# Patient Record
Sex: MALE | Race: WHITE | Employment: PART TIME | URBAN - METROPOLITAN AREA
[De-identification: names, ages, dates, MRNs, and addresses within clinical notes are randomized per-mention and may not be internally consistent; named-entity substitution may affect disease eponyms.]

---

## 2021-03-23 ENCOUNTER — HOSPITAL ENCOUNTER (OUTPATIENT)
Dept: GENERAL RADIOLOGY | Age: 61
Discharge: HOME | End: 2021-03-25
Payer: COMMERCIAL

## 2021-03-23 ENCOUNTER — HOSPITAL ENCOUNTER (OUTPATIENT)
Dept: CT IMAGING | Age: 61
Discharge: HOME | End: 2021-03-25
Payer: COMMERCIAL

## 2021-03-23 ENCOUNTER — HOSPITAL ENCOUNTER (OUTPATIENT)
Age: 61
Discharge: HOME | End: 2021-03-25
Payer: COMMERCIAL

## 2021-03-23 DIAGNOSIS — R52 PAIN: ICD-10-CM

## 2021-03-23 DIAGNOSIS — R27.0 ATAXIA: ICD-10-CM

## 2021-03-23 DIAGNOSIS — S32.009A CLOSED FRACTURE OF LUMBAR VERTEBRA WITHOUT SPINAL CORD INJURY, INITIAL ENCOUNTER (HCC): ICD-10-CM

## 2021-03-23 DIAGNOSIS — S32.89XA FRACTURE OF OTH PARTS OF PELVIS, INIT FOR CLOS FX (HCC): ICD-10-CM

## 2021-03-23 DIAGNOSIS — S00.93XA CONTUSION OF HEAD, UNSPECIFIED PART OF HEAD, INITIAL ENCOUNTER: ICD-10-CM

## 2021-03-23 PROCEDURE — 72170 X-RAY EXAM OF PELVIS: CPT

## 2021-03-23 PROCEDURE — 72110 X-RAY EXAM L-2 SPINE 4/>VWS: CPT

## 2021-03-23 PROCEDURE — 6360000004 HC RX CONTRAST MEDICATION: Performed by: RADIOLOGY

## 2021-03-23 PROCEDURE — 70470 CT HEAD/BRAIN W/O & W/DYE: CPT

## 2021-03-23 RX ADMIN — IOPAMIDOL 90 ML: 755 INJECTION, SOLUTION INTRAVENOUS at 15:31

## 2022-04-08 ENCOUNTER — APPOINTMENT (OUTPATIENT)
Dept: GENERAL RADIOLOGY | Age: 62
DRG: 287 | End: 2022-04-08
Payer: COMMERCIAL

## 2022-04-08 ENCOUNTER — HOSPITAL ENCOUNTER (INPATIENT)
Age: 62
LOS: 1 days | Discharge: HOME | DRG: 287 | End: 2022-04-13
Attending: EMERGENCY MEDICINE | Admitting: INTERNAL MEDICINE
Payer: COMMERCIAL

## 2022-04-08 DIAGNOSIS — R07.9 CHEST PAIN, UNSPECIFIED TYPE: Primary | ICD-10-CM

## 2022-04-08 PROBLEM — F10.10 ALCOHOL ABUSE: Status: ACTIVE | Noted: 2022-04-08

## 2022-04-08 LAB
ACETAMINOPHEN LEVEL: <5 MCG/ML (ref 10–30)
ALBUMIN SERPL-MCNC: 4.7 G/DL (ref 3.5–5.2)
ALP BLD-CCNC: 55 U/L (ref 40–129)
ALT SERPL-CCNC: 59 U/L (ref 0–40)
ANION GAP SERPL CALCULATED.3IONS-SCNC: 15 MMOL/L (ref 7–16)
APTT: 31.6 SEC (ref 24.5–35.1)
AST SERPL-CCNC: 137 U/L (ref 0–39)
BASOPHILS ABSOLUTE: 0.04 E9/L (ref 0–0.2)
BASOPHILS RELATIVE PERCENT: 0.9 % (ref 0–2)
BILIRUB SERPL-MCNC: 0.6 MG/DL (ref 0–1.2)
BUN BLDV-MCNC: 9 MG/DL (ref 6–23)
CALCIUM SERPL-MCNC: 9.4 MG/DL (ref 8.6–10.2)
CHLORIDE BLD-SCNC: 98 MMOL/L (ref 98–107)
CO2: 24 MMOL/L (ref 22–29)
CREAT SERPL-MCNC: 0.8 MG/DL (ref 0.7–1.2)
EOSINOPHILS ABSOLUTE: 0.02 E9/L (ref 0.05–0.5)
EOSINOPHILS RELATIVE PERCENT: 0.5 % (ref 0–6)
ETHANOL: 69 MG/DL (ref 0–0.08)
GFR AFRICAN AMERICAN: >60
GFR NON-AFRICAN AMERICAN: >60 ML/MIN/1.73
GLUCOSE BLD-MCNC: 113 MG/DL (ref 74–99)
HCT VFR BLD CALC: 42.1 % (ref 37–54)
HEMOGLOBIN: 14.7 G/DL (ref 12.5–16.5)
IMMATURE GRANULOCYTES #: 0.01 E9/L
IMMATURE GRANULOCYTES %: 0.2 % (ref 0–5)
INR BLD: 0.9
LYMPHOCYTES ABSOLUTE: 0.9 E9/L (ref 1.5–4)
LYMPHOCYTES RELATIVE PERCENT: 21.2 % (ref 20–42)
MCH RBC QN AUTO: 33.5 PG (ref 26–35)
MCHC RBC AUTO-ENTMCNC: 34.9 % (ref 32–34.5)
MCV RBC AUTO: 95.9 FL (ref 80–99.9)
MONOCYTES ABSOLUTE: 0.62 E9/L (ref 0.1–0.95)
MONOCYTES RELATIVE PERCENT: 14.6 % (ref 2–12)
NEUTROPHILS ABSOLUTE: 2.65 E9/L (ref 1.8–7.3)
NEUTROPHILS RELATIVE PERCENT: 62.6 % (ref 43–80)
PDW BLD-RTO: 12.3 FL (ref 11.5–15)
PLATELET # BLD: 96 E9/L (ref 130–450)
PLATELET CONFIRMATION: NORMAL
PMV BLD AUTO: 10.3 FL (ref 7–12)
POTASSIUM SERPL-SCNC: 3.7 MMOL/L (ref 3.5–5)
PRO-BNP: 80 PG/ML (ref 0–125)
PROTHROMBIN TIME: 10.3 SEC (ref 9.3–12.4)
RBC # BLD: 4.39 E12/L (ref 3.8–5.8)
SALICYLATE, SERUM: 3.9 MG/DL (ref 0–30)
SODIUM BLD-SCNC: 137 MMOL/L (ref 132–146)
TOTAL PROTEIN: 7.4 G/DL (ref 6.4–8.3)
TRICYCLIC ANTIDEPRESSANTS SCREEN SERUM: NEGATIVE NG/ML
TROPONIN, HIGH SENSITIVITY: 10 NG/L (ref 0–11)
TROPONIN, HIGH SENSITIVITY: 9 NG/L (ref 0–11)
WBC # BLD: 4.2 E9/L (ref 4.5–11.5)

## 2022-04-08 PROCEDURE — 80053 COMPREHEN METABOLIC PANEL: CPT

## 2022-04-08 PROCEDURE — G0378 HOSPITAL OBSERVATION PER HR: HCPCS

## 2022-04-08 PROCEDURE — 85025 COMPLETE CBC W/AUTO DIFF WBC: CPT

## 2022-04-08 PROCEDURE — 6370000000 HC RX 637 (ALT 250 FOR IP): Performed by: EMERGENCY MEDICINE

## 2022-04-08 PROCEDURE — 84484 ASSAY OF TROPONIN QUANT: CPT

## 2022-04-08 PROCEDURE — 96374 THER/PROPH/DIAG INJ IV PUSH: CPT

## 2022-04-08 PROCEDURE — 96372 THER/PROPH/DIAG INJ SC/IM: CPT

## 2022-04-08 PROCEDURE — 82077 ASSAY SPEC XCP UR&BREATH IA: CPT

## 2022-04-08 PROCEDURE — 2580000003 HC RX 258: Performed by: INTERNAL MEDICINE

## 2022-04-08 PROCEDURE — 80143 DRUG ASSAY ACETAMINOPHEN: CPT

## 2022-04-08 PROCEDURE — 80307 DRUG TEST PRSMV CHEM ANLYZR: CPT

## 2022-04-08 PROCEDURE — 80179 DRUG ASSAY SALICYLATE: CPT

## 2022-04-08 PROCEDURE — 85730 THROMBOPLASTIN TIME PARTIAL: CPT

## 2022-04-08 PROCEDURE — 85610 PROTHROMBIN TIME: CPT

## 2022-04-08 PROCEDURE — 71045 X-RAY EXAM CHEST 1 VIEW: CPT

## 2022-04-08 PROCEDURE — 93005 ELECTROCARDIOGRAM TRACING: CPT | Performed by: EMERGENCY MEDICINE

## 2022-04-08 PROCEDURE — 2580000003 HC RX 258: Performed by: EMERGENCY MEDICINE

## 2022-04-08 PROCEDURE — 6360000002 HC RX W HCPCS: Performed by: INTERNAL MEDICINE

## 2022-04-08 PROCEDURE — 2060000000 HC ICU INTERMEDIATE R&B

## 2022-04-08 PROCEDURE — 83880 ASSAY OF NATRIURETIC PEPTIDE: CPT

## 2022-04-08 PROCEDURE — 99285 EMERGENCY DEPT VISIT HI MDM: CPT

## 2022-04-08 RX ORDER — ONDANSETRON 2 MG/ML
4 INJECTION INTRAMUSCULAR; INTRAVENOUS EVERY 6 HOURS PRN
Status: DISCONTINUED | OUTPATIENT
Start: 2022-04-08 | End: 2022-04-12

## 2022-04-08 RX ORDER — LORAZEPAM 1 MG/1
3 TABLET ORAL
Status: DISCONTINUED | OUTPATIENT
Start: 2022-04-08 | End: 2022-04-12

## 2022-04-08 RX ORDER — MULTIVIT WITH MINERALS/LUTEIN
1000 TABLET ORAL 2 TIMES DAILY
COMMUNITY

## 2022-04-08 RX ORDER — LORAZEPAM 1 MG/1
1 TABLET ORAL
Status: DISCONTINUED | OUTPATIENT
Start: 2022-04-08 | End: 2022-04-12

## 2022-04-08 RX ORDER — ONDANSETRON 4 MG/1
4 TABLET, ORALLY DISINTEGRATING ORAL EVERY 8 HOURS PRN
Status: DISCONTINUED | OUTPATIENT
Start: 2022-04-08 | End: 2022-04-13 | Stop reason: HOSPADM

## 2022-04-08 RX ORDER — LORAZEPAM 1 MG/1
2 TABLET ORAL ONCE
Status: COMPLETED | OUTPATIENT
Start: 2022-04-08 | End: 2022-04-08

## 2022-04-08 RX ORDER — LANOLIN ALCOHOL/MO/W.PET/CERES
1000 CREAM (GRAM) TOPICAL DAILY
Status: DISCONTINUED | OUTPATIENT
Start: 2022-04-09 | End: 2022-04-13 | Stop reason: HOSPADM

## 2022-04-08 RX ORDER — LORAZEPAM 2 MG/ML
3 INJECTION INTRAMUSCULAR
Status: DISCONTINUED | OUTPATIENT
Start: 2022-04-08 | End: 2022-04-12

## 2022-04-08 RX ORDER — ASPIRIN 81 MG/1
81 TABLET ORAL DAILY
COMMUNITY

## 2022-04-08 RX ORDER — ACETAMINOPHEN 325 MG/1
650 TABLET ORAL EVERY 6 HOURS PRN
Status: DISCONTINUED | OUTPATIENT
Start: 2022-04-08 | End: 2022-04-13 | Stop reason: HOSPADM

## 2022-04-08 RX ORDER — ASPIRIN 81 MG/1
81 TABLET ORAL DAILY
Status: DISCONTINUED | OUTPATIENT
Start: 2022-04-09 | End: 2022-04-13 | Stop reason: HOSPADM

## 2022-04-08 RX ORDER — ACETAMINOPHEN 650 MG/1
650 SUPPOSITORY RECTAL EVERY 6 HOURS PRN
Status: DISCONTINUED | OUTPATIENT
Start: 2022-04-08 | End: 2022-04-12

## 2022-04-08 RX ORDER — LORAZEPAM 2 MG/ML
1 INJECTION INTRAMUSCULAR
Status: DISCONTINUED | OUTPATIENT
Start: 2022-04-08 | End: 2022-04-12

## 2022-04-08 RX ORDER — THIAMINE HYDROCHLORIDE 100 MG/ML
100 INJECTION, SOLUTION INTRAMUSCULAR; INTRAVENOUS DAILY
Status: DISCONTINUED | OUTPATIENT
Start: 2022-04-08 | End: 2022-04-13 | Stop reason: HOSPADM

## 2022-04-08 RX ORDER — SODIUM CHLORIDE 9 MG/ML
INJECTION, SOLUTION INTRAVENOUS PRN
Status: DISCONTINUED | OUTPATIENT
Start: 2022-04-08 | End: 2022-04-13 | Stop reason: HOSPADM

## 2022-04-08 RX ORDER — SODIUM CHLORIDE 0.9 % (FLUSH) 0.9 %
5-40 SYRINGE (ML) INJECTION EVERY 12 HOURS SCHEDULED
Status: DISCONTINUED | OUTPATIENT
Start: 2022-04-08 | End: 2022-04-13 | Stop reason: HOSPADM

## 2022-04-08 RX ORDER — SODIUM CHLORIDE 0.9 % (FLUSH) 0.9 %
5-40 SYRINGE (ML) INJECTION PRN
Status: DISCONTINUED | OUTPATIENT
Start: 2022-04-08 | End: 2022-04-13 | Stop reason: HOSPADM

## 2022-04-08 RX ORDER — ASPIRIN 81 MG/1
324 TABLET, CHEWABLE ORAL ONCE
Status: COMPLETED | OUTPATIENT
Start: 2022-04-08 | End: 2022-04-08

## 2022-04-08 RX ORDER — LORAZEPAM 1 MG/1
4 TABLET ORAL
Status: DISCONTINUED | OUTPATIENT
Start: 2022-04-08 | End: 2022-04-12

## 2022-04-08 RX ORDER — ZINC SULFATE 50(220)MG
50 CAPSULE ORAL DAILY
COMMUNITY

## 2022-04-08 RX ORDER — POLYETHYLENE GLYCOL 3350 17 G/17G
17 POWDER, FOR SOLUTION ORAL DAILY PRN
Status: DISCONTINUED | OUTPATIENT
Start: 2022-04-08 | End: 2022-04-13 | Stop reason: HOSPADM

## 2022-04-08 RX ORDER — LORAZEPAM 1 MG/1
2 TABLET ORAL
Status: DISCONTINUED | OUTPATIENT
Start: 2022-04-08 | End: 2022-04-12

## 2022-04-08 RX ORDER — LORAZEPAM 2 MG/ML
4 INJECTION INTRAMUSCULAR
Status: DISCONTINUED | OUTPATIENT
Start: 2022-04-08 | End: 2022-04-12

## 2022-04-08 RX ORDER — LORAZEPAM 2 MG/ML
2 INJECTION INTRAMUSCULAR
Status: DISCONTINUED | OUTPATIENT
Start: 2022-04-08 | End: 2022-04-12

## 2022-04-08 RX ADMIN — SODIUM CHLORIDE, PRESERVATIVE FREE 10 ML: 5 INJECTION INTRAVENOUS at 23:25

## 2022-04-08 RX ADMIN — Medication 10 ML: at 23:58

## 2022-04-08 RX ADMIN — LORAZEPAM 2 MG: 1 TABLET ORAL at 15:14

## 2022-04-08 RX ADMIN — LORAZEPAM 2 MG: 1 TABLET ORAL at 23:25

## 2022-04-08 RX ADMIN — ENOXAPARIN SODIUM 40 MG: 40 INJECTION SUBCUTANEOUS at 23:25

## 2022-04-08 RX ADMIN — LORAZEPAM 1 MG: 1 TABLET ORAL at 16:39

## 2022-04-08 RX ADMIN — ASPIRIN 81 MG 324 MG: 81 TABLET ORAL at 16:28

## 2022-04-08 RX ADMIN — THIAMINE HYDROCHLORIDE 100 MG: 100 INJECTION, SOLUTION INTRAMUSCULAR; INTRAVENOUS at 23:25

## 2022-04-08 NOTE — ED PROVIDER NOTES
and oriented x3, tremor   Head: Normocephalic and atraumatic  Eyes: PERRL, EOMI, sclera non icteric  Mouth: Oropharynx clear, handling secretions, no trismus, no asymmetry of the posterior oropharynx or uvular edema  Neck: Supple, full ROM, no stridor, no meningeal signs  Respiratory: Lungs clear to auscultation bilaterally, no wheezes, rales, or rhonchi. Not in respiratory distress  Cardiovascular:  Regular rate. Regular rhythm. 2+ distal pulses. Equal extremity pulses. Chest: No chest wall tenderness  GI:  Abdomen Soft, Non tender, Non distended. No rebound, guarding, or rigidity. No pulsatile masses. Musculoskeletal: Moves all extremities x 4. Warm and well perfused, no clubbing, cyanosis, or edema. Capillary refill <3 seconds  Integument: skin warm and dry. No rashes. Neurologic: GCS 15, no focal deficits, symmetric strength 5/5 in the upper and lower extremities bilaterally  Psychiatric: Normal Affect          -------------------------------------------------- RESULTS -------------------------------------------------  I have personally reviewed all laboratory and imaging results for this patient. Results are listed below.      LABS: (Lab results interpreted by me)  Results for orders placed or performed during the hospital encounter of 04/08/22   Comprehensive Metabolic Panel   Result Value Ref Range    Sodium 137 132 - 146 mmol/L    Potassium 3.7 3.5 - 5.0 mmol/L    Chloride 98 98 - 107 mmol/L    CO2 24 22 - 29 mmol/L    Anion Gap 15 7 - 16 mmol/L    Glucose 113 (H) 74 - 99 mg/dL    BUN 9 6 - 23 mg/dL    CREATININE 0.8 0.7 - 1.2 mg/dL    GFR Non-African American >60 >=60 mL/min/1.73    GFR African American >60     Calcium 9.4 8.6 - 10.2 mg/dL    Total Protein 7.4 6.4 - 8.3 g/dL    Albumin 4.7 3.5 - 5.2 g/dL    Total Bilirubin 0.6 0.0 - 1.2 mg/dL    Alkaline Phosphatase 55 40 - 129 U/L    ALT 59 (H) 0 - 40 U/L     (H) 0 - 39 U/L   Troponin   Result Value Ref Range    Troponin, High Sensitivity 10 0 - 11 ng/L   CBC with Auto Differential   Result Value Ref Range    WBC 4.2 (L) 4.5 - 11.5 E9/L    RBC 4.39 3.80 - 5.80 E12/L    Hemoglobin 14.7 12.5 - 16.5 g/dL    Hematocrit 42.1 37.0 - 54.0 %    MCV 95.9 80.0 - 99.9 fL    MCH 33.5 26.0 - 35.0 pg    MCHC 34.9 (H) 32.0 - 34.5 %    RDW 12.3 11.5 - 15.0 fL    Platelets 96 (L) 159 - 450 E9/L    MPV 10.3 7.0 - 12.0 fL    Neutrophils % 62.6 43.0 - 80.0 %    Immature Granulocytes % 0.2 0.0 - 5.0 %    Lymphocytes % 21.2 20.0 - 42.0 %    Monocytes % 14.6 (H) 2.0 - 12.0 %    Eosinophils % 0.5 0.0 - 6.0 %    Basophils % 0.9 0.0 - 2.0 %    Neutrophils Absolute 2.65 1.80 - 7.30 E9/L    Immature Granulocytes # 0.01 E9/L    Lymphocytes Absolute 0.90 (L) 1.50 - 4.00 E9/L    Monocytes Absolute 0.62 0.10 - 0.95 E9/L    Eosinophils Absolute 0.02 (L) 0.05 - 0.50 E9/L    Basophils Absolute 0.04 0.00 - 0.20 E9/L   Brain Natriuretic Peptide   Result Value Ref Range    Pro-BNP 80 0 - 125 pg/mL   Protime-INR   Result Value Ref Range    Protime 10.3 9.3 - 12.4 sec    INR 0.9    APTT   Result Value Ref Range    aPTT 31.6 24.5 - 35.1 sec   Serum Drug Screen   Result Value Ref Range    Ethanol Lvl 69 mg/dL    Acetaminophen Level <5.0 (L) 10.0 - 63.7 mcg/mL    Salicylate, Serum 3.9 0.0 - 30.0 mg/dL    TCA Scrn NEGATIVE Cutoff:300 ng/mL   ,       RADIOLOGY:  Interpreted by Radiologist unless otherwise specified  XR CHEST PORTABLE   Final Result   No acute process.                EKG Interpretation  Interpreted by emergency department physician, Dr. Elma Jackson     Sinus, rate 93, T wave inversions inferiorly, no stemi, no when compared to previous study         ------------------------- NURSING NOTES AND VITALS REVIEWED ---------------------------   The nursing notes within the ED encounter and vital signs as below have been reviewed by myself  BP (!) 144/88   Pulse 83   Temp 97.8 °F (36.6 °C)   Resp 21   Ht 5' 11\" (1.803 m)   Wt 157 lb (71.2 kg)   SpO2 97%   BMI 21.90 kg/m²     Oxygen Saturation Interpretation: Normal    The patients available past medical records and past encounters were reviewed. ------------------------------ ED COURSE/MEDICAL DECISION MAKING----------------------  Medications   sodium chloride flush 0.9 % injection 5-40 mL (has no administration in time range)   sodium chloride flush 0.9 % injection 5-40 mL (has no administration in time range)   0.9 % sodium chloride infusion (has no administration in time range)   LORazepam (ATIVAN) tablet 1 mg (has no administration in time range)     Or   LORazepam (ATIVAN) injection 1 mg (has no administration in time range)     Or   LORazepam (ATIVAN) tablet 2 mg (has no administration in time range)     Or   LORazepam (ATIVAN) injection 2 mg (has no administration in time range)     Or   LORazepam (ATIVAN) tablet 3 mg (has no administration in time range)     Or   LORazepam (ATIVAN) injection 3 mg (has no administration in time range)     Or   LORazepam (ATIVAN) tablet 4 mg (has no administration in time range)     Or   LORazepam (ATIVAN) injection 4 mg (has no administration in time range)   aspirin chewable tablet 324 mg (has no administration in time range)   LORazepam (ATIVAN) tablet 2 mg (2 mg Oral Given 4/8/22 1514)           The cardiac monitor revealed sinus with a heart rate in the 80s as interpreted by me. The cardiac monitor was ordered secondary to the patient's CP and to monitor the patient for dysrhythmia. CPT [id number]         Medical Decision Making:    Patient did have some tremors on arrival.  He was placed on the CIWA protocol. Labs and imaging reviewed. Discussed with the hospitalist given his EKG changes, patient will be admitted. Counseling: The emergency provider has spoken with the patient and discussed todays results, in addition to providing specific details for the plan of care and counseling regarding the diagnosis and prognosis.   Questions are answered at this time and they are agreeable with the plan.       --------------------------------- IMPRESSION AND DISPOSITION ---------------------------------    IMPRESSION  1. Chest pain, unspecified type        DISPOSITION  Disposition: Admit to telemetry  Patient condition is stable        NOTE: This report was transcribed using voice recognition software.  Every effort was made to ensure accuracy; however, inadvertent computerized transcription errors may be present       Love Trevizo MD  04/08/22 1331

## 2022-04-08 NOTE — Clinical Note
Discharge Plan[de-identified] Other/[facility])   Telemetry/Cardiac Monitoring Required?: Yes

## 2022-04-08 NOTE — ED NOTES
Pt reports having his last drink (whisky) this morning. The exact time was unknown.       Wallace Hall RN  04/08/22 9041

## 2022-04-09 LAB
AMPHETAMINE SCREEN, URINE: NOT DETECTED
BARBITURATE SCREEN URINE: NOT DETECTED
BENZODIAZEPINE SCREEN, URINE: POSITIVE
CANNABINOID SCREEN URINE: NOT DETECTED
COCAINE METABOLITE SCREEN URINE: NOT DETECTED
FENTANYL SCREEN, URINE: NOT DETECTED
Lab: ABNORMAL
METHADONE SCREEN, URINE: NOT DETECTED
OPIATE SCREEN URINE: NOT DETECTED
OXYCODONE URINE: NOT DETECTED
PHENCYCLIDINE SCREEN URINE: NOT DETECTED

## 2022-04-09 PROCEDURE — 96375 TX/PRO/DX INJ NEW DRUG ADDON: CPT

## 2022-04-09 PROCEDURE — 6370000000 HC RX 637 (ALT 250 FOR IP): Performed by: INTERNAL MEDICINE

## 2022-04-09 PROCEDURE — APPSS60 APP SPLIT SHARED TIME 46-60 MINUTES: Performed by: PHYSICIAN ASSISTANT

## 2022-04-09 PROCEDURE — 99204 OFFICE O/P NEW MOD 45 MIN: CPT | Performed by: INTERNAL MEDICINE

## 2022-04-09 PROCEDURE — 6360000002 HC RX W HCPCS: Performed by: EMERGENCY MEDICINE

## 2022-04-09 PROCEDURE — 6360000002 HC RX W HCPCS: Performed by: INTERNAL MEDICINE

## 2022-04-09 PROCEDURE — 80307 DRUG TEST PRSMV CHEM ANLYZR: CPT

## 2022-04-09 PROCEDURE — 93005 ELECTROCARDIOGRAM TRACING: CPT | Performed by: PHYSICIAN ASSISTANT

## 2022-04-09 PROCEDURE — G0378 HOSPITAL OBSERVATION PER HR: HCPCS

## 2022-04-09 PROCEDURE — 2580000003 HC RX 258: Performed by: EMERGENCY MEDICINE

## 2022-04-09 PROCEDURE — 6370000000 HC RX 637 (ALT 250 FOR IP): Performed by: EMERGENCY MEDICINE

## 2022-04-09 PROCEDURE — 2580000003 HC RX 258: Performed by: INTERNAL MEDICINE

## 2022-04-09 PROCEDURE — 96376 TX/PRO/DX INJ SAME DRUG ADON: CPT

## 2022-04-09 RX ADMIN — SODIUM CHLORIDE, PRESERVATIVE FREE 10 ML: 5 INJECTION INTRAVENOUS at 09:39

## 2022-04-09 RX ADMIN — CYANOCOBALAMIN TAB 1000 MCG 1000 MCG: 1000 TAB at 09:39

## 2022-04-09 RX ADMIN — LORAZEPAM 2 MG: 2 INJECTION INTRAMUSCULAR; INTRAVENOUS at 21:04

## 2022-04-09 RX ADMIN — Medication 10 ML: at 21:58

## 2022-04-09 RX ADMIN — LORAZEPAM 1 MG: 2 INJECTION INTRAMUSCULAR; INTRAVENOUS at 16:33

## 2022-04-09 RX ADMIN — LORAZEPAM 1 MG: 2 INJECTION INTRAMUSCULAR; INTRAVENOUS at 09:39

## 2022-04-09 RX ADMIN — ASPIRIN 81 MG: 81 TABLET, COATED ORAL at 09:39

## 2022-04-09 RX ADMIN — SODIUM CHLORIDE, PRESERVATIVE FREE 10 ML: 5 INJECTION INTRAVENOUS at 21:03

## 2022-04-09 RX ADMIN — SODIUM CHLORIDE, PRESERVATIVE FREE 10 ML: 5 INJECTION INTRAVENOUS at 16:33

## 2022-04-09 RX ADMIN — Medication 10 ML: at 09:39

## 2022-04-09 RX ADMIN — THIAMINE HYDROCHLORIDE 100 MG: 100 INJECTION, SOLUTION INTRAMUSCULAR; INTRAVENOUS at 09:39

## 2022-04-09 RX ADMIN — LORAZEPAM 4 MG: 1 TABLET ORAL at 02:43

## 2022-04-09 ASSESSMENT — PAIN SCALES - GENERAL: PAINLEVEL_OUTOF10: 0

## 2022-04-09 NOTE — H&P
Angela Vega is a 64 y.o. male  Chief Complaint   Patient presents with    Chest Pain     chest pain and SOB for 1 week, PCP told him he was suppose to get an ECHO back in september last year but never got it done. +ETOH every day      HPI  As above, pt states he wants to go home, but he wants to have an Echo first.  He denies any more cp, sob, n/v.  He does admit to drinking on a daily basis, but gets upset once asking about it. Allergies   Allergen Reactions    Sulfa Antibiotics      rash       No current facility-administered medications on file prior to encounter.      Current Outpatient Medications on File Prior to Encounter   Medication Sig Dispense Refill    aspirin 81 MG EC tablet Take 81 mg by mouth daily      zinc sulfate (ZINCATE) 220 (50 Zn) MG capsule Take 50 mg by mouth daily      vitamin D 25 MCG (1000 UT) CAPS Take 1,000 Units by mouth daily      Ascorbic Acid (VITAMIN C) 1000 MG tablet Take 1,000 mg by mouth 2 times daily      ELDERBERRY PO Take 1 tablet by mouth daily      Cyanocobalamin (VITAMIN B12) 1000 MCG TBCR Take 1,000 mcg by mouth daily        Past Medical History:   Diagnosis Date    Chronic venous insufficiency 2/26/2015    History of DVT (deep vein thrombosis) 2/26/2015    Hx of blood clots 2009    LEFT LEG    Inguinal hernia     Varicose veins of lower extremities 2/26/2015   Code status: FULL CODE  Past Surgical History:   Procedure Laterality Date    HERNIA REPAIR Bilateral 12/028/2013    Inguinal     Social History     Socioeconomic History    Marital status: Single     Spouse name: Not on file    Number of children: Not on file    Years of education: Not on file    Highest education level: Not on file   Occupational History    Not on file   Tobacco Use    Smoking status: Never Smoker    Smokeless tobacco: Current User     Types: Chew   Substance and Sexual Activity    Alcohol use: Yes     Comment: 4-5 shots daily    Drug use: No    Sexual activity: Not on file   Other Topics Concern    Not on file   Social History Narrative    Not on file     Social Determinants of Health     Financial Resource Strain:     Difficulty of Paying Living Expenses: Not on file   Food Insecurity:     Worried About [address] in the Last Year: Not on file    [employer] in the Last Year: Not on file   Transportation Needs:     Lack of Transportation (Medical): Not on file    Lack of Transportation (Non-Medical): Not on file   Physical Activity:     Days of Exercise per Week: Not on file    Minutes of Exercise per Session: Not on file   Stress:     Feeling of Stress : Not on file   Social Connections:     Frequency of Communication with Friends and Family: Not on file    Frequency of Social Gatherings with Friends and Family: Not on file    Attends [employer]ism Services: Not on file    Active Member of [address] [place] [address] or Organizations: Not on file    Attends Club or Organization Meetings: Not on file    Marital Status: Not on file   Intimate Partner Violence:     Fear of Current or Ex-Partner: Not on file    Emotionally Abused: Not on file    Physically Abused: Not on file    Sexually Abused: Not on file   Housing Stability:     Unable to Pay for Housing in the Last Year: Not on file    Number of [place] in the Last Year: Not on file    Unstable Housing in the Last Year: Not on file     History reviewed. No pertinent family history. ROS  Patient positive for  Chest pain   Patient denies any fever, chills, night sweats, weight changes   Denies headache, visual changes,   Denies dysphagia, odynophagia dysphonia,   Denies SOB, cough, sputum production,   Denies pressure, orthopnea, palpitations,   Denies abd pain, N/V/D/C/melena, hematochezia,   Denies urinary frequency, urgency, dysuria, hematuria,   Denies any acute muscle aches, paresthesias, weakness, seizure, syncopal episodes, Denies depression, anxiety.   OBJECTIVE  Vitals:    04/09/22 0244   BP: (!) 168/88   Pulse: 99   Resp:    Temp:    SpO2:      Gen: AO3, NAD  Head: AT/NC, PERRL, EOMIx2, no icterus, conjunctival injection  Neck: No JVD, carotid bruits, LAD, thyroid non-palpable  Heart: RRR with no murmurs, rubs, gallops  Lungs: CTA B/L, no W/R/R  Abd: soft, NT, ND, BS+, no G/R, no HSM  Ext: No C/C/E, pulses intact distally B/L  Neuro: CN 2-12 grossly intact with no focal deficits    ASSESSMENT  1. Chest pain, unspecified type    2. ETOH abuse  3.  Hx of DVT  PLAN  Admit to observation  Cardiology team consulted

## 2022-04-09 NOTE — CONSULTS
Inpatient Cardiology Consultation      Reason for Consult:  Chest pain     Consulting Physician: Dr Gwendolyn Sultana    Requesting Physician:  Dr Lester Rashid     Date of Consultation: 4/9/2022    HISTORY OF PRESENT ILLNESS:   Mr Vicki Almodovar is a 63 yo male who is not previously known to [facility]. Past medical history includes chronic venous insufficiency, history of DVTs on Eliquis, alcohol abuse, chewing tobacco abuse. Presented to [facility] 4/8/2022 with chest tightness  VS: 97.8-96/18-97% RA-144/88  Labs: WBC 4.2, H&H 14.7/42.1, platelets 96. K+ 3.7, BUN/SCR 9/0.8, glucose 113. Troponin 10   proBNP 80     CXR no acute process     He was given aspirin 324 mg in the emergency department and admitted to a telemetry monitoring floor. Cardiology was asked see the patient for further recommendations and management of chest pain. He states that for 1 week he has had intermittent chest tightness that is left-sided nonradiating with no association to exertion or rest.  It lasted for 5 minutes and then resolves on its own. Denies any associated shortness of breath palpitations lightheadedness dizziness falls or loss of consciousness. Please note: past medical records were reviewed per electronic medical record (EMR) - see detailed reports under Past Medical/ Surgical History. Past Medical History:    1. Alcohol abuse   2. Chewing tobacco abuse   3. DVTs following Lower extremity fractures   4. Extensive L DVT 2015: Ultrasound showing nonocclusive DVT, superficial femoral vein, popliteal vein, trunk vein and posterior tibial vein. Medications Prior to admit:  Prior to Admission medications    Medication Sig Start Date End Date Taking?  Authorizing Provider   aspirin 81 MG EC tablet Take 81 mg by mouth daily   Yes Historical Provider, MD   zinc sulfate (ZINCATE) 220 (50 Zn) MG capsule Take 50 mg by mouth daily   Yes Historical Provider, MD   vitamin D 25 MCG (1000 UT) CAPS Take 1,000 Units by mouth daily   Yes Historical Provider, MD   Ascorbic Acid (VITAMIN C) 1000 MG tablet Take 1,000 mg by mouth 2 times daily   Yes Historical Provider, MD   ELDERBERRY PO Take 1 tablet by mouth daily   Yes Historical Provider, MD   Cyanocobalamin (VITAMIN B12) 1000 MCG TBCR Take 1,000 mcg by mouth daily     Historical Provider, MD       Current Medications:    Current Facility-Administered Medications: sodium chloride flush 0.9 % injection 5-40 mL, 5-40 mL, IntraVENous, 2 times per day  sodium chloride flush 0.9 % injection 5-40 mL, 5-40 mL, IntraVENous, PRN  0.9 % sodium chloride infusion, , IntraVENous, PRN  LORazepam (ATIVAN) tablet 1 mg, 1 mg, Oral, Q1H PRN **OR** LORazepam (ATIVAN) injection 1 mg, 1 mg, IntraVENous, Q1H PRN **OR** LORazepam (ATIVAN) tablet 2 mg, 2 mg, Oral, Q1H PRN **OR** LORazepam (ATIVAN) injection 2 mg, 2 mg, IntraVENous, Q1H PRN **OR** LORazepam (ATIVAN) tablet 3 mg, 3 mg, Oral, Q1H PRN **OR** LORazepam (ATIVAN) injection 3 mg, 3 mg, IntraVENous, Q1H PRN **OR** LORazepam (ATIVAN) tablet 4 mg, 4 mg, Oral, Q1H PRN **OR** LORazepam (ATIVAN) injection 4 mg, 4 mg, IntraVENous, Q1H PRN  aspirin EC tablet 81 mg, 81 mg, Oral, Daily  vitamin B-12 (CYANOCOBALAMIN) tablet 1,000 mcg, 1,000 mcg, Oral, Daily  enoxaparin (LOVENOX) injection 40 mg, 40 mg, SubCUTAneous, Daily  thiamine (B-1) injection 100 mg, 100 mg, IntraVENous, Daily  sodium chloride flush 0.9 % injection 5-40 mL, 5-40 mL, IntraVENous, 2 times per day  sodium chloride flush 0.9 % injection 5-40 mL, 5-40 mL, IntraVENous, PRN  0.9 % sodium chloride infusion, , IntraVENous, PRN  ondansetron (ZOFRAN-ODT) disintegrating tablet 4 mg, 4 mg, Oral, Q8H PRN **OR** ondansetron (ZOFRAN) injection 4 mg, 4 mg, IntraVENous, Q6H PRN  polyethylene glycol (GLYCOLAX) packet 17 g, 17 g, Oral, Daily PRN  acetaminophen (TYLENOL) tablet 650 mg, 650 mg, Oral, Q6H PRN **OR** acetaminophen (TYLENOL) suppository 650 mg, 650 mg, Rectal, Q6H PRN    Allergies:  Sulfa antibiotics    Social History:    Lives independently and completes activities of daily living without chest pain or shortness of breath. 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 Does not require supplemental oxygen or use ambulation assistance devices. Current chewing tobacco abuse, 2 cans/week. Occasional cigars  Alcohol abuse, 1/5/day since age 15  Denies illicit drug use  Full code        Family History: Mother, CAD age 62s .      REVIEW OF SYSTEMS:     · Constitutional: Denies fevers, chills, night sweats, and fatigue  · HEENT: Denies headaches, nose bleeds, and blurred vision,oral pain, abscess or lesion. · Musculoskeletal: Denies falls, pain to BLE with ambulation and edema to BLE. · Neurological: Denies dizziness and lightheadedness, numbness and tingling  · Cardiovascular: Denies chest pain, palpitations, and feelings of heart racing. · Respiratory: Denies orthopnea and PND, cough, NOLASCO  · Gastrointestinal: Denies heartburn, nausea/vomiting, diarrhea and constipation, black/bloody, and tarry stools. · Genitourinary: Denies dysuria and hematuria  · Hematologic: Denies excessive bruising or bleeding  · Lymphatic: Denies lumps and bumps to neck, axilla, breast, and groin      PHYSICAL EXAM:   BP (!) 168/88   Pulse 99   Temp 95.5 °F (35.3 °C) (Temporal)   Resp 8   Ht 5' 11\" (1.803 m)   Wt 157 lb (71.2 kg)   SpO2 94%   BMI 21.90 kg/m²   CONST:  Well developed,  male who appears his stated age. Awake, alert, cooperative, no apparent distress. Somewhat jittery and agitated in bed now  HEENT:   Head- Normocephalic, atraumatic   Eyes- Conjunctivae pink, anicteric  Throat- Oral mucosa pink and moist  Neck-  No stridor, trachea midline, no jugular venous distention. CHEST: Chest symmetrical and non-tender to palpation. RESPIRATORY: Lung sounds clear throughout fields bilaterally. No wheeze or rhonchi noted.    CARDIOVASCULAR:     No carotid bruit noted bilaterally   Heart Ausculation- Regular rate and rhythm, + systolic murmur left upper and lower sternal border. PV: No lower extremity edema. No varicosities. ABDOMEN: Soft, non-tender to light palpation. Bowel sounds present. MS: Good muscle strength and tone. No atrophy or abnormal movements. : Deferred   SKIN: Warm and dry no statis dermatitis or ulcers   NEURO / PSYCH: Oriented to person, place and time. Speech clear and appropriate. Follows all commands. Pleasant affect     DATA:    ECG: Sinus rhythm, heart rate 93. Inferior T wave inversions suggestive of ischemia. Tele strips: Not on telemetry  Diagnostic:      Intake/Output Summary (Last 24 hours) at 4/9/2022 0753  Last data filed at 4/9/2022 1813  Gross per 24 hour   Intake --   Output 0 ml   Net 0 ml       Labs:   CBC:   Recent Labs     04/08/22  1507   WBC 4.2*   HGB 14.7   HCT 42.1   PLT 96*     BMP:   Recent Labs     04/08/22  1507      K 3.7   CO2 24   BUN 9   CREATININE 0.8   LABGLOM >60   CALCIUM 9.4     PT/INR:   Recent Labs     04/08/22  1507   PROTIME 10.3   INR 0.9     APTT:  Recent Labs     04/08/22  1507   APTT 31.6     LIVER PROFILE:  Recent Labs     04/08/22  1507   *   ALT 59*   LABALBU 4.7       Assessment:   1. Atypical chest pain with negative troponin x2. EKG changes concerning for ischemia  2. Hypertension, untreated  3. Systolic murmur  4. Alcohol abuse, with withdrawal  5. Chewing tobacco abuse  6. Mildly elevated AST/ALT  7. Chronic thrombocytopenia/leukopenia    Plan:   · Continue aspirin  · Check lipid panel, consider statin therapy  · Start Toprol-XL 25 mg twice daily. · Echocardiogram to assess ventricular function and valvular heart disease. · Ischemic evaluation prior to discharge pending results of echocardiogram.  Cardiac catheterization versus coronary CTA/Lexiscan MPS.   · Further recommendations pending results of echocardiogram.  We will follow      Electronically signed by THUAN Mayen on 4/9/2022 at 7:53 AM     Patient chart reviewed with Lucy Hogan. Patient seen and examined independently. Assessment and plan were made in collaboration with Lucy Hogan.    60-year-old male seen in consultation due to chest pain. He has history of DVTs treated with Eliquis in the past, thrombocytopenia, leukopenia and alcohol abuse. Patient is a poor historian. Patient was admitted due to chest tightness on and off over the past week. His discomfort was left-sided, described as tightness lasting 1 minute and occurring while active. Patient also feels occasional palpitations. He is currently having alcohol withdrawal.    His physical exam is pertinent for:  Blood pressure 144/88 and heart rate in the 90s per minute. Age male laying in bed in no acute distress but he is shaking. No carotid bruit and no jugular venous distention. Regular heart with 2/6 systolic murmur best heard at the apex. Clear lungs with no wheezing or crackles. Soft nontender abdomen with no abdominal bruit. No lower extremity edema with palpable pedal pulses. EKG revealed sinus rhythm at 90 bpm with inverted T waves in leads III and aVF. Chest x-ray no acute process. Labs:  Troponin 10 and 9.  proBNP 80  BUN 9, creatinine 0.8 and potassium 3.7. ALT 59 and . Alcohol level 69. WBC 4.2, hematocrit 42.1, hemoglobin 14.7 and platelets 96. Assessment:   1. Chest pain with negative troponin x2. It sounded atypical.   However, EKG changes are concerning for ischemia  2. Hypertension  3. Systolic murmur  4. Alcohol abuse: Currently having withdrawal  5. Chewing tobacco abuse  6. Mildly elevated AST/ALT  7. Chronic thrombocytopenia probably due to liver disease due to chronic alcohol abuse.     Plan:   · Continue aspirin  · Check lipid panel, consider statin therapy if no evidence of liver cirrhosis.   · Start Toprol-XL 25 mg twice daily and titrate up if needed during alcohol withdrawal.  · Echocardiogram to rule out alcohol induced cardiomyopathy and assess the etiology of the systolic heart murmur. · Ischemic evaluation prior to discharge pending results of echocardiogram.  Cardiac catheterization versus Lexiscan MPS depending on the patient clinical course and ejection fraction by echocardiogram.    Thank you for allowing me to share in the care of patient.

## 2022-04-10 PROCEDURE — 2580000003 HC RX 258: Performed by: EMERGENCY MEDICINE

## 2022-04-10 PROCEDURE — 6370000000 HC RX 637 (ALT 250 FOR IP): Performed by: INTERNAL MEDICINE

## 2022-04-10 PROCEDURE — 6360000002 HC RX W HCPCS: Performed by: INTERNAL MEDICINE

## 2022-04-10 PROCEDURE — 96372 THER/PROPH/DIAG INJ SC/IM: CPT

## 2022-04-10 PROCEDURE — 96376 TX/PRO/DX INJ SAME DRUG ADON: CPT

## 2022-04-10 PROCEDURE — 6360000002 HC RX W HCPCS: Performed by: EMERGENCY MEDICINE

## 2022-04-10 PROCEDURE — G0378 HOSPITAL OBSERVATION PER HR: HCPCS

## 2022-04-10 PROCEDURE — 99215 OFFICE O/P EST HI 40 MIN: CPT | Performed by: INTERNAL MEDICINE

## 2022-04-10 PROCEDURE — 2580000003 HC RX 258: Performed by: INTERNAL MEDICINE

## 2022-04-10 RX ORDER — METOPROLOL SUCCINATE 25 MG/1
25 TABLET, EXTENDED RELEASE ORAL DAILY
Status: DISCONTINUED | OUTPATIENT
Start: 2022-04-10 | End: 2022-04-13

## 2022-04-10 RX ADMIN — LORAZEPAM 2 MG: 2 INJECTION INTRAMUSCULAR; INTRAVENOUS at 12:40

## 2022-04-10 RX ADMIN — SODIUM CHLORIDE, PRESERVATIVE FREE 10 ML: 5 INJECTION INTRAVENOUS at 20:27

## 2022-04-10 RX ADMIN — THIAMINE HYDROCHLORIDE 100 MG: 100 INJECTION, SOLUTION INTRAMUSCULAR; INTRAVENOUS at 08:43

## 2022-04-10 RX ADMIN — LORAZEPAM 1 MG: 2 INJECTION INTRAMUSCULAR; INTRAVENOUS at 20:27

## 2022-04-10 RX ADMIN — ENOXAPARIN SODIUM 40 MG: 40 INJECTION SUBCUTANEOUS at 08:44

## 2022-04-10 RX ADMIN — Medication 400 MG: at 14:47

## 2022-04-10 RX ADMIN — CYANOCOBALAMIN TAB 1000 MCG 1000 MCG: 1000 TAB at 08:43

## 2022-04-10 RX ADMIN — Medication 10 ML: at 08:44

## 2022-04-10 RX ADMIN — ASPIRIN 81 MG: 81 TABLET, COATED ORAL at 08:43

## 2022-04-10 RX ADMIN — SODIUM CHLORIDE, PRESERVATIVE FREE 10 ML: 5 INJECTION INTRAVENOUS at 12:40

## 2022-04-10 RX ADMIN — METOPROLOL SUCCINATE 25 MG: 25 TABLET, EXTENDED RELEASE ORAL at 14:47

## 2022-04-10 RX ADMIN — SODIUM CHLORIDE, PRESERVATIVE FREE 10 ML: 5 INJECTION INTRAVENOUS at 08:43

## 2022-04-10 ASSESSMENT — PAIN SCALES - GENERAL
PAINLEVEL_OUTOF10: 0
PAINLEVEL_OUTOF10: 0

## 2022-04-10 NOTE — PROGRESS NOTES
Subjective: The patient is awake and alert. No problems overnight. Denies chest pain, angina, and dyspnea. Denies abdominal pain. Tolerating diet. No nausea or vomiting. No more chest pain. Objective:  Pt is resting in nad   BP (!) 162/92   Pulse 81   Temp 99 °F (37.2 °C)   Resp 18   Ht 5' 11\" (1.803 m)   Wt 157 lb (71.2 kg)   SpO2 99%   BMI 21.90 kg/m²   HEENT no adenopathy no bruits  Heart:  RRR, no murmurs, gallops, or rubs.   Lungs:  CTA bilaterally, no wheeze, rales or rhonchi  Abd: bowel sounds present, nontender, nondistended, no masses  Extrem:  No clubbing, cyanosis, or edema  WBC/Hgb/Hct/Plts:  4.2/14.7/42.1/96 (04/08 1241) basic metabolic panel     Assessment:    Patient Active Problem List   Diagnosis    History of DVT (deep vein thrombosis)    Chronic venous insufficiency    Varicose veins of both lower extremities    Chest pain    Alcohol abuse       Plan:    Echo pending  Cardiology team to decide further testing once this is done      Shavonne Stein DO  6:43 AM  4/10/2022

## 2022-04-10 NOTE — PLAN OF CARE
Problem: Falls - Risk of:  Goal: Will remain free from falls  Description: Will remain free from falls  Outcome: Met This Shift     Problem: Falls - Risk of:  Goal: Absence of physical injury  Description: Absence of physical injury  Outcome: Met This Shift     Problem: Fluid Volume - Deficit:  Goal: Absence of fluid volume deficit signs and symptoms  Description: Absence of fluid volume deficit signs and symptoms  Outcome: Met This Shift     Problem: Sleep Pattern Disturbance:  Goal: Appears well-rested  Description: Appears well-rested  Outcome: Met This Shift

## 2022-04-10 NOTE — PLAN OF CARE
Problem: Falls - Risk of:  Goal: Will remain free from falls  Description: Will remain free from falls  4/10/2022 1043 by Ellen Villafana RN  Outcome: Met This Shift  4/10/2022 0316 by Matheus Reddy RN  Outcome: Met This Shift  Goal: Absence of physical injury  Description: Absence of physical injury  4/10/2022 1043 by Ellen Villafana RN  Outcome: Met This Shift  4/10/2022 0316 by Matheus Reddy RN  Outcome: Met This Shift     Problem: Fluid Volume - Deficit:  Goal: Absence of fluid volume deficit signs and symptoms  Description: Absence of fluid volume deficit signs and symptoms  4/10/2022 1043 by Ellen Villafana RN  Outcome: Met This Shift  4/10/2022 0316 by Matheus Reddy RN  Outcome: Met This Shift     Problem: Nutrition Deficit:  Goal: Ability to achieve adequate nutritional intake will improve  Description: Ability to achieve adequate nutritional intake will improve  Outcome: Met This Shift     Problem: Sleep Pattern Disturbance:  Goal: Appears well-rested  Description: Appears well-rested  4/10/2022 1043 by Ellen Villafana RN  Outcome: Met This Shift  4/10/2022 0316 by Matheus Reddy RN  Outcome: Met This Shift

## 2022-04-11 LAB
ALBUMIN SERPL-MCNC: 4.1 G/DL (ref 3.5–5.2)
ALP BLD-CCNC: 49 U/L (ref 40–129)
ALT SERPL-CCNC: 74 U/L (ref 0–40)
ANION GAP SERPL CALCULATED.3IONS-SCNC: 11 MMOL/L (ref 7–16)
AST SERPL-CCNC: 109 U/L (ref 0–39)
BILIRUB SERPL-MCNC: 1 MG/DL (ref 0–1.2)
BUN BLDV-MCNC: 14 MG/DL (ref 6–23)
CALCIUM SERPL-MCNC: 9.6 MG/DL (ref 8.6–10.2)
CHLORIDE BLD-SCNC: 96 MMOL/L (ref 98–107)
CO2: 26 MMOL/L (ref 22–29)
CREAT SERPL-MCNC: 0.9 MG/DL (ref 0.7–1.2)
D DIMER: 278 NG/ML DDU
EKG ATRIAL RATE: 90 BPM
EKG ATRIAL RATE: 93 BPM
EKG P AXIS: 65 DEGREES
EKG P AXIS: 75 DEGREES
EKG P-R INTERVAL: 144 MS
EKG P-R INTERVAL: 174 MS
EKG Q-T INTERVAL: 364 MS
EKG Q-T INTERVAL: 374 MS
EKG QRS DURATION: 106 MS
EKG QRS DURATION: 106 MS
EKG QTC CALCULATION (BAZETT): 452 MS
EKG QTC CALCULATION (BAZETT): 457 MS
EKG R AXIS: -18 DEGREES
EKG R AXIS: 67 DEGREES
EKG T AXIS: -9 DEGREES
EKG T AXIS: 12 DEGREES
EKG VENTRICULAR RATE: 90 BPM
EKG VENTRICULAR RATE: 93 BPM
GFR AFRICAN AMERICAN: >60
GFR NON-AFRICAN AMERICAN: >60 ML/MIN/1.73
GLUCOSE BLD-MCNC: 93 MG/DL (ref 74–99)
HCT VFR BLD CALC: 41.6 % (ref 37–54)
HEMOGLOBIN: 14 G/DL (ref 12.5–16.5)
LV EF: 65 %
LVEF MODALITY: NORMAL
MCH RBC QN AUTO: 33.3 PG (ref 26–35)
MCHC RBC AUTO-ENTMCNC: 33.7 % (ref 32–34.5)
MCV RBC AUTO: 98.8 FL (ref 80–99.9)
METER GLUCOSE: 106 MG/DL (ref 74–99)
PDW BLD-RTO: 12.2 FL (ref 11.5–15)
PLATELET # BLD: 105 E9/L (ref 130–450)
PMV BLD AUTO: 10.9 FL (ref 7–12)
POTASSIUM SERPL-SCNC: 4.3 MMOL/L (ref 3.5–5)
RBC # BLD: 4.21 E12/L (ref 3.8–5.8)
SODIUM BLD-SCNC: 133 MMOL/L (ref 132–146)
TOTAL PROTEIN: 6.7 G/DL (ref 6.4–8.3)
WBC # BLD: 5.5 E9/L (ref 4.5–11.5)

## 2022-04-11 PROCEDURE — 36415 COLL VENOUS BLD VENIPUNCTURE: CPT

## 2022-04-11 PROCEDURE — 93010 ELECTROCARDIOGRAM REPORT: CPT | Performed by: INTERNAL MEDICINE

## 2022-04-11 PROCEDURE — 96376 TX/PRO/DX INJ SAME DRUG ADON: CPT

## 2022-04-11 PROCEDURE — 6370000000 HC RX 637 (ALT 250 FOR IP): Performed by: INTERNAL MEDICINE

## 2022-04-11 PROCEDURE — 85378 FIBRIN DEGRADE SEMIQUANT: CPT

## 2022-04-11 PROCEDURE — G0378 HOSPITAL OBSERVATION PER HR: HCPCS

## 2022-04-11 PROCEDURE — 80053 COMPREHEN METABOLIC PANEL: CPT

## 2022-04-11 PROCEDURE — 82962 GLUCOSE BLOOD TEST: CPT

## 2022-04-11 PROCEDURE — 2580000003 HC RX 258: Performed by: INTERNAL MEDICINE

## 2022-04-11 PROCEDURE — 6360000002 HC RX W HCPCS: Performed by: INTERNAL MEDICINE

## 2022-04-11 PROCEDURE — 99222 1ST HOSP IP/OBS MODERATE 55: CPT | Performed by: INTERNAL MEDICINE

## 2022-04-11 PROCEDURE — 6360000002 HC RX W HCPCS: Performed by: EMERGENCY MEDICINE

## 2022-04-11 PROCEDURE — 97165 OT EVAL LOW COMPLEX 30 MIN: CPT

## 2022-04-11 PROCEDURE — 93306 TTE W/DOPPLER COMPLETE: CPT

## 2022-04-11 PROCEDURE — 96372 THER/PROPH/DIAG INJ SC/IM: CPT

## 2022-04-11 PROCEDURE — APPSS60 APP SPLIT SHARED TIME 46-60 MINUTES: Performed by: PHYSICIAN ASSISTANT

## 2022-04-11 PROCEDURE — 85027 COMPLETE CBC AUTOMATED: CPT

## 2022-04-11 RX ADMIN — CYANOCOBALAMIN TAB 1000 MCG 1000 MCG: 1000 TAB at 10:40

## 2022-04-11 RX ADMIN — SODIUM CHLORIDE, PRESERVATIVE FREE 10 ML: 5 INJECTION INTRAVENOUS at 10:40

## 2022-04-11 RX ADMIN — ENOXAPARIN SODIUM 40 MG: 40 INJECTION SUBCUTANEOUS at 10:40

## 2022-04-11 RX ADMIN — LORAZEPAM 2 MG: 2 INJECTION INTRAMUSCULAR; INTRAVENOUS at 02:17

## 2022-04-11 RX ADMIN — Medication 400 MG: at 10:40

## 2022-04-11 RX ADMIN — ASPIRIN 81 MG: 81 TABLET, COATED ORAL at 10:40

## 2022-04-11 RX ADMIN — THIAMINE HYDROCHLORIDE 100 MG: 100 INJECTION, SOLUTION INTRAMUSCULAR; INTRAVENOUS at 10:39

## 2022-04-11 RX ADMIN — METOPROLOL SUCCINATE 25 MG: 25 TABLET, EXTENDED RELEASE ORAL at 10:40

## 2022-04-11 RX ADMIN — SODIUM CHLORIDE, PRESERVATIVE FREE 10 ML: 5 INJECTION INTRAVENOUS at 10:41

## 2022-04-11 ASSESSMENT — PAIN SCALES - GENERAL: PAINLEVEL_OUTOF10: 0

## 2022-04-11 NOTE — CARE COORDINATION
Met with pt to discuss discharge planning/transition of care. Pt lives alone in a 3 story home, pt's bedroom upstairs and bathroom is on first floor. Pt reports no issues ambulating, active , and is currently employed. Pt is agreeable to Peer [facility], referral was made to [facility]. Dr. Alyce Brunner is PCP and [facility] on [address] is pharmacy. Plan is home with family/friend to transport home. Pt currently on CIWA scale. ECHO ordered, pt for stress test 4/12. Will follow for any needs. Sung Alonzo, MSW, LSW

## 2022-04-11 NOTE — PROGRESS NOTES
Inpatient [facility] Cardiology Progress Note    Date of Service: 4/11/2022    Reason for Follow-up: Chest pain    SUBJECTIVE:     Patient is a 64year old WM known to Dr. Khalida Terrell through inpatient consultation this admission. · Patient seen and examined at bedside this AM.  · Appears mildly anxious, but is alert and oriented x 3.   · Denies any current chest pain since admission. States he has had intermittent left sided chest pain since a mechanical fall that occurred in March 2021 --> he \"jammed\" his left arm into the ground. Episodes are random -- not related to exertion, cough, palpation, meals or deep inspiration. Occur at rest, moving his left arm helps to alleviate the pain. Described as a tightness, with each episode lasting approximately 5 minutes before spontaneous resolution. · Denies shortness of breath, nausea, emesis, diaphoresis, dizziness, palpitations, near syncope or syncope. Denies PND, orthopnea or peripheral edema. HOME MEDICATIONS:  Prior to Admission medications    Medication Sig Start Date End Date Taking?  Authorizing Provider   aspirin 81 MG EC tablet Take 81 mg by mouth daily   Yes Historical Provider, MD   zinc sulfate (ZINCATE) 220 (50 Zn) MG capsule Take 50 mg by mouth daily   Yes Historical Provider, MD   vitamin D 25 MCG (1000 UT) CAPS Take 1,000 Units by mouth daily   Yes Historical Provider, MD   Ascorbic Acid (VITAMIN C) 1000 MG tablet Take 1,000 mg by mouth 2 times daily   Yes Historical Provider, MD   ELDERBERRY PO Take 1 tablet by mouth daily   Yes Historical Provider, MD   Cyanocobalamin (VITAMIN B12) 1000 MCG TBCR Take 1,000 mcg by mouth daily     Historical Provider, MD       CURRENT MEDICATIONS:      Current Facility-Administered Medications:     metoprolol succinate (TOPROL XL) extended release tablet 25 mg, 25 mg, Oral, Daily, Kenan Bustos MD, 25 mg at 04/10/22 1447    magnesium oxide (MAG-OX) tablet 400 mg, 400 mg, Oral, Daily, Mykel Alaniz DO, 400 mg at 04/10/22 1447    perflutren lipid microspheres (DEFINITY) injection 1.65 mg, 1.5 mL, IntraVENous, ONCE PRN, Lucy Francisco    sodium chloride flush 0.9 % injection 5-40 mL, 5-40 mL, IntraVENous, 2 times per day, Kirstie Villa MD, 10 mL at 04/10/22 0844    sodium chloride flush 0.9 % injection 5-40 mL, 5-40 mL, IntraVENous, PRN, Kirstie Villa MD    0.9 % sodium chloride infusion, , IntraVENous, PRN, Kirstie Villa MD    LORazepam (ATIVAN) tablet 1 mg, 1 mg, Oral, Q1H PRN, 1 mg at 04/08/22 1639 **OR** LORazepam (ATIVAN) injection 1 mg, 1 mg, IntraVENous, Q1H PRN, 1 mg at 04/10/22 2027 **OR** LORazepam (ATIVAN) tablet 2 mg, 2 mg, Oral, Q1H PRN, 2 mg at 04/08/22 2325 **OR** LORazepam (ATIVAN) injection 2 mg, 2 mg, IntraVENous, Q1H PRN, 2 mg at 04/11/22 0217 **OR** LORazepam (ATIVAN) tablet 3 mg, 3 mg, Oral, Q1H PRN **OR** LORazepam (ATIVAN) injection 3 mg, 3 mg, IntraVENous, Q1H PRN **OR** LORazepam (ATIVAN) tablet 4 mg, 4 mg, Oral, Q1H PRN, 4 mg at 04/09/22 0243 **OR** LORazepam (ATIVAN) injection 4 mg, 4 mg, IntraVENous, Q1H PRN, Kirstie Villa MD    aspirin EC tablet 81 mg, 81 mg, Oral, Daily, Royetta Boas, MD, 81 mg at 04/10/22 0843    vitamin B-12 (CYANOCOBALAMIN) tablet 1,000 mcg, 1,000 mcg, Oral, Daily, Royetta Boas, MD, 1,000 mcg at 04/10/22 0843    enoxaparin (LOVENOX) injection 40 mg, 40 mg, SubCUTAneous, Daily, Royetta Boas, MD, 40 mg at 04/10/22 0844    thiamine (B-1) injection 100 mg, 100 mg, IntraVENous, Daily, Royetta Boas, MD, 100 mg at 04/10/22 0843    sodium chloride flush 0.9 % injection 5-40 mL, 5-40 mL, IntraVENous, 2 times per day, Royetta Boas, MD, 10 mL at 04/10/22 2027    sodium chloride flush 0.9 % injection 5-40 mL, 5-40 mL, IntraVENous, PRN, Royetta Boas, MD, 10 mL at 04/10/22 1240    0.9 % sodium chloride infusion, , IntraVENous, PRN, Royetta Boas, MD    ondansetron (ZOFRAN-ODT) disintegrating tablet 4 mg, 4 mg, Oral, Q8H PRN **OR** ondansetron (ZOFRAN) injection 4 mg, 4 mg, IntraVENous, Q6H PRN, Darlene Abraham MD    polyethylene glycol (GLYCOLAX) packet 17 g, 17 g, Oral, Daily PRN, Darlene Abraham MD    acetaminophen (TYLENOL) tablet 650 mg, 650 mg, Oral, Q6H PRN **OR** acetaminophen (TYLENOL) suppository 650 mg, 650 mg, Rectal, Q6H PRN, Darlene Abraham MD      ALLERGIES:  Sulfa antibiotics        REVIEW OF SYSTEMS:     Negative except as noted above in HPI. PHYSICAL EXAM:   BP (!) 161/94   Pulse 81   Temp 96.5 °F (35.8 °C) (Temporal)   Resp 18   Ht 5' 11\" (1.803 m)   Wt 157 lb (71.2 kg)   SpO2 95%   BMI 21.90 kg/m²   CONST:  Well developed, well nourished WM who appears stated age. Awake, alert, cooperative, no apparent distress. HEENT:   Head- Normocephalic, atraumatic. Eyes- Conjunctivae pink, anicteric. Neck-  No stridor, trachea midline, no apparent jugular venous distention. CHEST: Chest symmetrical and non-tender to palpation. No accessory muscle use or intercostal retractions. RESPIRATORY: Lung sounds - clear throughout fields. No wheezing, rales or rhonchi. CARDIOVASCULAR:     No noted carotid bruit. Heart Ausculation- Regular rate and rhythm, 3/6 systolic murmur heard best at the apex. PV: No significant lower extremity edema. Pedal pulses palpable, no clubbing or cyanosis. ABDOMEN: Soft, non-tender to light palpation. Bowel sounds present. MS: Good muscle strength and tone. No atrophy or abnormal movements. SKIN: Warm and dry. NEURO / PSYCH: Oriented to person, place and time. Speech clear and appropriate. Follows all commands. Pleasant affect. DATA:    Telemetry: Not on telemetry during my time on the floor    Diagnostic:  All diagnostic testing and lab work thus far this admission reviewed in detail. CXR 4/8/2022:  Impression:  No acute process.       Intake/Output Summary (Last 24 hours) at 4/11/2022 0811  Last data filed at 4/11/2022 0554  Gross per 24 hour   Intake 180 ml   Output 0 ml   Net 180 ml       Labs: CBC:   Recent Labs     04/08/22  1507   WBC 4.2*   HGB 14.7   HCT 42.1   PLT 96*     BMP:   Recent Labs     04/08/22  1507      K 3.7   CO2 24   BUN 9   CREATININE 0.8   LABGLOM >60   CALCIUM 9.4     PT/INR:   Recent Labs     04/08/22  1507   PROTIME 10.3   INR 0.9     APTT:  Recent Labs     04/08/22  1507   APTT 31.6     LIVER PROFILE:  Recent Labs     04/08/22  1507   *   ALT 59*   LABALBU 4.7      04/09/22 0954    Amphetamine Screen, Urine Negative <1000 ng/mL NOT DETECTED    Barbiturate Screen, Ur Negative < 200 ng/mL NOT DETECTED    Benzodiazepine Screen, Urine Negative < 200 ng/mL POSITIVE Abnormal     Cannabinoid Scrn, Ur Negative < 50ng/mL NOT DETECTED    Cocaine Metabolite Screen, Urine Negative < 300 ng/mL NOT DETECTED    Opiate Scrn, Ur Negative < 300ng/mL NOT DETECTED    Comment: Note:  The Opiate Screen is not intended to detect Oxycodone. PCP Screen, Urine Negative < 25 ng/mL NOT DETECTED    Methadone Screen, Urine Negative <300 ng/mL NOT DETECTED    Oxycodone Urine Negative <100 ng/mL NOT DETECTED    FENTANYL SCREEN, URINE Negative <1 ng/mL NOT DETECTED       Ref Range & Units 04/08/22 1643 04/08/22 1507   Troponin, High Sensitivity 0 - 11 ng/L 9  10 CM      Ref Range & Units 04/08/22 1507   Pro-BNP 0 - 125 pg/mL 80      04/08/22 1507     Ethanol Lvl mg/dL 69    Comment: Not Detected   Acetaminophen Level 10.0 - 30.0 mcg/mL <8.6 UYN     Salicylate, Serum 0.0 - 30.0 mg/dL 3.9    TCA Scrn Cutoff:300 ng/mL NEGATIVE        ASSESSMENT:  · Atypical chest pain with negative troponin x 2. EKG reviewed, no concerning EKG changes. Remains chest pain free today. · 4+ MR on TTE this admission. · HTN, newly diagnosed. Remains uncontrolled. · Alcohol abuse, now with withdrawal.  · Chewing tobacco abuse. · Mildly elevated AST/ALT. · Chronic thrombocytopenia and leukopenia. · History of extensive LLE DVT (+US in 2011 and 2015, ?chronic) -- treated with Eliquis.   Seen by Vascular Surgery. · Chronic venous insufficiency. RECOMMENDATIONS:  · Inpatient TTE for evaluation of VHD/LV function --> preliminary review per Dr. Francisco Wallis reveals 4+ MR with normal LV function. For CHELSI tomorrow for further evaluation of MV disease. · Check d-dimer in setting of atypical chest pain and history of DVT. · Check lipid panel. · Continue current cardiac medications the same at this time. · Further recommendations to follow as per Dr. Francisco Wallis. The above case and recommendations have been discussed and made in collaboration with Dr. Francisco Wallis. NOTE: This report was transcribed using voice recognition software. Every effort was made to ensure accuracy; however, inadvertent computerized transcription errors may be present. Janene Mckeon, [address] [place] [address] Cardiology    Electronically signed by Margaret Finney PA-C on 4/11/2022 at 8:27 AM   PHYSICIAN ADDENDUM:  I independently reviewed the above documentation and made amendments as needed. I formulated the assessment and plan with the IDALIA with my contribution being >50%.  Plan discussed with the patient/family/care team.      Ruchi Hernandez MD, [facility]  Interventional Cardiology/Structural Heart Disease  Office: [phone]  Coordinator: Melani Finley

## 2022-04-11 NOTE — PROGRESS NOTES
[address]        [id number]                                                  Patient Name: Jennifer Pastrana    MRN: [id number]    : 1960    Room: [address] [place] [address]          Evaluating OT: Viki Sylvester OTR/L; [id number]       Referring Provider: Rosi King MD    Specific Provider Orders/Date: OT Eval and Treat 22      Diagnosis: Chest pain, EtOH abuse    Surgery: None this admission     Pertinent Medical History:  has a past medical history of Chronic venous insufficiency, History of DVT (deep vein thrombosis), Hx of blood clots, Inguinal hernia, and Varicose veins of lower extremities.      Recommended Adaptive Equipment: TBD     Precautions:  Fall Risk, seizure precautions     Assessment of current deficits    [x] Functional mobility  [x]ADLs  [x] Strength               [x]Cognition    [x] Functional transfers   [x] IADLs         [x] Safety Awareness   [x]Endurance    [] Fine Coordination              [x] Balance      [] Vision/perception   []Sensation     []Gross Motor Coordination  [] ROM  [] Delirium                   [] Motor Control     OT PLAN OF CARE   OT POC based on physician orders, patient diagnosis and results of clinical assessment    Frequency/Duration 1-3 days/wk for 2 weeks PRN   Specific OT Treatment Interventions to include:   * Instruction/training on adapted ADL techniques and AE recommendations to increase functional independence within precautions       * Training on energy conservation strategies, correct breathing pattern and techniques to improve independence/tolerance for self-care routine  * Functional transfer/mobility training/DME recommendations for increased independence, safety, and fall prevention  * Patient/Family education to increase follow through with safety techniques and functional independence  * Recommendation of environmental modifications for increased safety with functional transfers/mobility and ADLs  * Therapeutic exercise to improve motor endurance, ROM, and functional strength for ADLs/functional transfers  * Therapeutic activities to facilitate/challenge dynamic balance, stand tolerance for increased safety and independence with ADLs  * Positioning to improve skin integrity, interaction with environment and functional independence    Home Living: Pt lives alone in 2 story home with level entry. Bed & bath located on 2nd floor with full flight of stairs & 1 handrail. Laundry in basement with full flight of stairs & 1 handrail. Bathroom setup: Walk-in shower   Equipment owned: cane, librado    Prior Level of Function: IND with ADLs , IND with IADLs; ambulated without AD  Driving: Yes  Occupation: Employed at [employer]    Pain Level: Pt with no c/o pain throughout duration of session  Cognition: A&O: 4/4; Follows 2 step directions   Memory:  Good   Sequencing:  Fair   Problem solving:  Fair   Judgement/safety:  Fair     Functional Assessment:  AM-PAC Daily Activity Raw Score: 16/24   Initial Eval Status  Date: 4/11/22 Treatment Status  Date: STGs = LTGs  Time frame: 10-14 days   Feeding Stand by Assist   Independent    Grooming Minimal Assist   Independent    UB Dressing Minimal Assist   Independent    LB Dressing Moderate Assist   Independent    Bathing Minimal Assist  Independent    Toileting Moderate Assist   Independent    Bed Mobility  Supine to sit: Stand by Assist   Sit to supine: Stand by Assist   Supine to sit: Independent   Sit to supine: Independent    Functional Transfers Sit to stand:Minimal Assist   Stand to sit:Minimal Assist  Stand pivot: Minimal Assist  Commode: Minimal Assist  Sit to stand:Independent    Stand to sit: Independent   Stand pivot: Independent   Commode: Independent     Functional Mobility Minimal Assist  Handheld assist within household distance  Independent    Balance Sitting:     Static - SBA Dynamic - SBA  Standing: Minimal Assist  Sitting:     Static: Independent      Dynamic: Independent   Standing: Independent    Activity Tolerance Fair  Good   Visual/  Perceptual Glasses: Yes-readers  Appears WFL      Safety Fair  Good  during ADL completion     Hand Dominance Right   AROM (PROM) Strength Additional Info:  Goal:   RUE  WFL 4/5 grossly tested good  and wfl FMC/dexterity noted during ADL tasks   Improve overall RUE strength WFL for participation in functional tasks       LUE WFL 4/5 grossly tested Good  and wfl FMC/dexterity noted during ADL tasks   Improve overall LUE strength WFL for participation in functional tasks       Hearing: [facility]   Sensation:  No c/o numbness or tingling BUE  Tone: WFL BUE  Edema: Unremarkable    Comment: Cleared by RN to see pt. Upon arrival patient awan and agreeable to OT session. At end of session, patient supine in bed with call light and phone within reach, all lines and tubes intact. Overall patient demonstrated supine in bed decreased independence and safety during completion of ADL/functional transfer/mobility tasks. Therapist facilitated ADL tasks, functional transfers, functional mobility, bed mobility to promote safety awareness, fall prevention strategies, & engagement throughout ALDs. Pt demo'd unsteady gait pattern completing functional mobility to<>from bed to bathroom. Pt sat on commode to void self of BM requiring assist to don/doff briefs. Pt would benefit from continued skilled OT to increase safety and independence with completion of ADL/IADL tasks for functional independence and quality of life. Rehab Potential: Good for established goals     LTG: maximize independence with ADLs to return to PLOF    Patient and/or family were instructed on functional diagnosis, prognosis/goals and OT plan of care. Demonstrated fair understanding.        Eval Complexity: Low  · History: Expanded chart review of medical records and additional review of physical, cognitive, or psychosocial history related to current functional performance  · Exam: 3+ performance deficits  · Assistance/Modification: Min/mod assistance or modifications required to perform tasks. May have comorbidities that affect occupational performance. Evaluation time includes thorough review of current medical information, gathering information on past medical & social history & PLOF, completion of standardized testing, informal observation of tasks, consultation with other medical professions/disciplines, assessment of data & development of POC/goals. Time In: 2:12p  Time Out: 2:24p  Total Treatment Time: 0    Min Units   OT Eval Low 32410  x     OT Eval Medium 37802      OT Eval High 80404      OT Re-Eval [id number]       Therapeutic Ex 32312       Therapeutic Activities 74163       ADL/Self Care 52929       Orthotic Management 45631       Manual [id number]     Neuro Re-Ed 86401       Non-Billable Time          Evaluation Time additionally includes thorough review of current medical information, gathering information on past medical history/social history and prior level of function, interpretation of standardized testing/informal observation of tasks, assessment of data and development of plan of care and goals.             Tobias Nguyễn OTR/L; [id number]

## 2022-04-11 NOTE — PROGRESS NOTES
Subjective:  Patient was seen on the floor earlier this morning  Admission details noted  Lying comfortably in the bed  No withdrawal symptoms today  No definite chest pain  He feels unsteady when he walks    Objective:    BP (!) 144/90   Pulse 91   Temp 97.8 °F (36.6 °C)   Resp 18   Ht 5' 11\" (1.803 m)   Wt 157 lb (71.2 kg)   SpO2 95%   BMI 21.90 kg/m²   Awake alert oriented  No withdrawal symptoms  Oral mucosa moist  Neck supple no adenopathy  Heart:  RRR systolic murmur at the apex  Lungs:  CTA bilaterally, no wheeze, rales or rhonchi  Abd: bowel sounds present, nontender, nondistended, no masses  Extrem:  No clubbing, cyanosis, or edema    Data reviewed    Assessment:  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

## 2022-04-12 ENCOUNTER — ANESTHESIA EVENT (OUTPATIENT)
Dept: CARDIAC CATH/INVASIVE PROCEDURES | Age: 62
DRG: 287 | End: 2022-04-12
Payer: COMMERCIAL

## 2022-04-12 ENCOUNTER — APPOINTMENT (OUTPATIENT)
Dept: CARDIAC CATH/INVASIVE PROCEDURES | Age: 62
DRG: 287 | End: 2022-04-12
Payer: COMMERCIAL

## 2022-04-12 ENCOUNTER — ANESTHESIA (OUTPATIENT)
Dept: CARDIAC CATH/INVASIVE PROCEDURES | Age: 62
DRG: 287 | End: 2022-04-12
Payer: COMMERCIAL

## 2022-04-12 VITALS
SYSTOLIC BLOOD PRESSURE: 110 MMHG | DIASTOLIC BLOOD PRESSURE: 78 MMHG | RESPIRATION RATE: 23 BRPM | OXYGEN SATURATION: 99 %

## 2022-04-12 LAB
CHOLESTEROL, TOTAL: 225 MG/DL (ref 0–199)
HDLC SERPL-MCNC: 68 MG/DL
LDL CHOLESTEROL CALCULATED: 143 MG/DL (ref 0–99)
LV EF: 63 %
LVEF MODALITY: NORMAL
TRIGL SERPL-MCNC: 69 MG/DL (ref 0–149)
VLDLC SERPL CALC-MCNC: 14 MG/DL

## 2022-04-12 PROCEDURE — 2580000003 HC RX 258: Performed by: INTERNAL MEDICINE

## 2022-04-12 PROCEDURE — 6360000002 HC RX W HCPCS: Performed by: NURSE ANESTHETIST, CERTIFIED REGISTERED

## 2022-04-12 PROCEDURE — 93312 ECHO TRANSESOPHAGEAL: CPT

## 2022-04-12 PROCEDURE — 6370000000 HC RX 637 (ALT 250 FOR IP): Performed by: INTERNAL MEDICINE

## 2022-04-12 PROCEDURE — 3700000001 HC ADD 15 MINUTES (ANESTHESIA)

## 2022-04-12 PROCEDURE — 93325 DOPPLER ECHO COLOR FLOW MAPG: CPT

## 2022-04-12 PROCEDURE — 93321 DOPPLER ECHO F-UP/LMTD STD: CPT

## 2022-04-12 PROCEDURE — 3700000000 HC ANESTHESIA ATTENDED CARE

## 2022-04-12 PROCEDURE — 97530 THERAPEUTIC ACTIVITIES: CPT

## 2022-04-12 PROCEDURE — 36415 COLL VENOUS BLD VENIPUNCTURE: CPT

## 2022-04-12 PROCEDURE — 2580000003 HC RX 258: Performed by: NURSE ANESTHETIST, CERTIFIED REGISTERED

## 2022-04-12 PROCEDURE — 2060000000 HC ICU INTERMEDIATE R&B

## 2022-04-12 PROCEDURE — 2580000003 HC RX 258: Performed by: EMERGENCY MEDICINE

## 2022-04-12 PROCEDURE — 80061 LIPID PANEL: CPT

## 2022-04-12 PROCEDURE — 97161 PT EVAL LOW COMPLEX 20 MIN: CPT

## 2022-04-12 PROCEDURE — 2709999900 HC NON-CHARGEABLE SUPPLY

## 2022-04-12 PROCEDURE — 6360000002 HC RX W HCPCS: Performed by: INTERNAL MEDICINE

## 2022-04-12 RX ORDER — PHENOBARBITAL 32.4 MG/1
32.4 TABLET ORAL 3 TIMES DAILY
Status: DISCONTINUED | OUTPATIENT
Start: 2022-04-12 | End: 2022-04-13 | Stop reason: HOSPADM

## 2022-04-12 RX ORDER — NICOTINE 21 MG/24HR
1 PATCH, TRANSDERMAL 24 HOURS TRANSDERMAL DAILY
Status: DISCONTINUED | OUTPATIENT
Start: 2022-04-12 | End: 2022-04-13 | Stop reason: HOSPADM

## 2022-04-12 RX ORDER — PROPOFOL 10 MG/ML
INJECTION, EMULSION INTRAVENOUS PRN
Status: DISCONTINUED | OUTPATIENT
Start: 2022-04-12 | End: 2022-04-12 | Stop reason: SDUPTHER

## 2022-04-12 RX ORDER — SODIUM CHLORIDE 9 MG/ML
INJECTION, SOLUTION INTRAVENOUS CONTINUOUS PRN
Status: DISCONTINUED | OUTPATIENT
Start: 2022-04-12 | End: 2022-04-12 | Stop reason: SDUPTHER

## 2022-04-12 RX ADMIN — ENOXAPARIN SODIUM 40 MG: 40 INJECTION SUBCUTANEOUS at 09:44

## 2022-04-12 RX ADMIN — CYANOCOBALAMIN TAB 1000 MCG 1000 MCG: 1000 TAB at 09:44

## 2022-04-12 RX ADMIN — Medication 10 ML: at 21:45

## 2022-04-12 RX ADMIN — ASPIRIN 81 MG: 81 TABLET, COATED ORAL at 09:45

## 2022-04-12 RX ADMIN — THIAMINE HYDROCHLORIDE 100 MG: 100 INJECTION, SOLUTION INTRAMUSCULAR; INTRAVENOUS at 09:44

## 2022-04-12 RX ADMIN — SODIUM CHLORIDE, PRESERVATIVE FREE 10 ML: 5 INJECTION INTRAVENOUS at 21:45

## 2022-04-12 RX ADMIN — PHENOBARBITAL 32.4 MG: 32.4 TABLET ORAL at 21:44

## 2022-04-12 RX ADMIN — PHENOBARBITAL 32.4 MG: 32.4 TABLET ORAL at 15:42

## 2022-04-12 RX ADMIN — METOPROLOL SUCCINATE 25 MG: 25 TABLET, EXTENDED RELEASE ORAL at 09:44

## 2022-04-12 RX ADMIN — PROPOFOL 430 MG: 10 INJECTION, EMULSION INTRAVENOUS at 12:35

## 2022-04-12 RX ADMIN — SODIUM CHLORIDE: 9 INJECTION, SOLUTION INTRAVENOUS at 12:13

## 2022-04-12 RX ADMIN — Medication 400 MG: at 09:45

## 2022-04-12 ASSESSMENT — PAIN SCALES - GENERAL
PAINLEVEL_OUTOF10: 0
PAINLEVEL_OUTOF10: 0

## 2022-04-12 NOTE — ANESTHESIA POSTPROCEDURE EVALUATION
Department of Anesthesiology  Postprocedure Note    Patient: Eunice Cruz  MRN: [id number]  YOB: 1960  Date of evaluation: 4/12/2022  Time:  1:35 PM     Procedure Summary     Date: 04/12/22 Room / Location: [facility] CATH LAB; [facility] ECHO    Anesthesia Start: 9206 Anesthesia Stop: 0020    Procedure: SEY CEHLSI Diagnosis:     Scheduled Providers: Tita Hightower MD; ALYSON Mendez - CRNA Responsible Provider: Sadi Dewitt DO    Anesthesia Type: MAC ASA Status: 3          Anesthesia Type: MAC    Antione Phase I:      Antione Phase II:      Last vitals: Reviewed and per EMR flowsheets.        Anesthesia Post Evaluation    Patient location during evaluation: bedside  Patient participation: complete - patient cannot participate  Level of consciousness: awake and alert  Airway patency: patent  Nausea & Vomiting: no nausea and no vomiting  Complications: no  Cardiovascular status: blood pressure returned to baseline  Respiratory status: acceptable  Hydration status: euvolemic

## 2022-04-12 NOTE — PROGRESS NOTES
Physical Therapy  Physical Therapy Initial Assessment     Name: Carolynn Oscar  : 1960  MRN: [id number]      Date of Service: 2022    Evaluating PT:  Polly Dunn, PT, DPT [id number]    Room #:  3027/2381-C  Diagnosis:  Alcohol abuse [F10.10]  Chest pain [R07.9]  Chest pain, unspecified type [R07.9]  PMHx/PSHx:  Blood clots, DVT, chronic venous insufficiency  Procedure/Surgery:  CHELSI (2022)  Precautions:  Fall risk, alarm  Equipment Needs:  None  Equipment Owned: SPC, FWW    SUBJECTIVE:    Pt lives alone in a 3 story home + basement with 0 stair(s) to enter. Bed is on 3rd floor and bath is on 2nd floor. There is a full flight of stairs with 1 rail to basement; full flight of stairs with 1 rail to 2nd floor; full flight of stairs with 2 rails to 3rd floor. Pt ambulated with no AD PTA. OBJECTIVE:   Initial Evaluation  Date: 2022 Treatment Short Term/ Long Term   Goals   AM-PAC 6 Clicks 34/85     Was pt agreeable to Eval/treatment? Yes     Does pt have pain? No     Bed Mobility  Rolling: NT  Supine to sit: Supervision  Sit to supine: Supervision  Scooting: NT  Rolling: Independent  Supine to sit: Independent  Sit to supine: Independent  Scooting: Independent   Transfers Sit to stand: SBA  Stand to sit: SBA  Stand pivot: NT  Sit to stand: Tiffanie  Stand to sit: Tiffanie  Stand pivot: Tiffanie with AAD   Ambulation    20' with no AD Ernesto  20', 120', 120' with [employer] SBA  300 feet with AAD Tiffanie   Stair negotiation: ascended and descended  NT  12 step(s) with 1 rail(s) Tiffanie   ROM BUE:  See OT note  BLE:  WFL     Strength BUE:  See OT note  BLE:  Grossly 5/5     Balance Sitting EOB:  Independent  Dynamic Standing:  Ernesto with no AD; SBA with [employer]  Sitting EOB:  Independent  Dynamic Standing:  Tiffanie with AAD     Pt is A & O x 4  Sensation:  Pt denies numbness and tingling to extremities  Edema:  Unremarkable    Vitals:   HR 77, SpO2 97% following activity. HR 85, SpO2 98% following activity.     Patient education  Pt educated on role of PT, safety during functional mobility, use of call light for assistance. Patient response to education:   Pt verbalized understanding Pt demonstrated skill Pt requires further education in this area   Yes Yes Yes     ASSESSMENT:    Conditions Requiring Skilled Therapeutic Intervention:    [x]Decreased strength     []Decreased ROM  [x]Decreased functional mobility  [x]Decreased balance   [x]Decreased endurance   []Decreased posture  []Decreased sensation  []Decreased coordination   []Decreased vision  [x]Decreased safety awareness   []Increased pain       Comments:  Session cleared by nursing. Patient sitting EOB with nursing present upon arrival; agreeable to PT evaluation. Tolerated sitting EOB without balance loss. Patient impulsive. Performed bed mobility without physical assistance. Performed several sit <> stand transfers throughout session. Ambulated with decreased speed; mild unsteadiness using no assistive device; increased stability using WW; recommended use of WW to ensure safety during functional mobility. Seated rest breaks provided between activity bouts. Denied dizziness, shortness of breath. Vitals monitored and noted. Patient left in semi-Whitlock's position with call light in reach. Patient would benefit from continued skilled PT services to address functional deficits. Treatment:  Patient practiced and was instructed in the following treatment:     Bed mobility - performed supine <> sit without physical assistance   Functional transfers - verbal cues to facilitate proper positioning; physical assistance provided as needed during activity   Ambulation - verbal cues to facilitate proper positioning, particularly related to walker approximation; physical assistance provided as needed during activity    Pt's/ family goals   1. Return home    Prognosis is good for reaching above PT goals.     Patient and or family understand(s) diagnosis, prognosis, and plan of care.  Yes    PHYSICAL THERAPY PLAN OF CARE:    PT POC is established based on physician order and patient diagnosis     Referring provider/PT Order:    04/11/22 0915  PT eval and treat  Start:  04/11/22 0915,   End:  04/11/22 0915,   ONE TIME,   Standing Count:  1 Occurrences,   R         Libertad Salas MD       Diagnosis:  Alcohol abuse [F10.10]  Chest pain [R07.9]  Chest pain, unspecified type [R07.9]  Specific instructions for next treatment:  Continue to facilitate safe performance of functional mobility; progress as appropriate. Current Treatment Recommendations:     [x] Strengthening to improve independence with functional mobility   [] ROM to improve independence with functional mobility   [x] Balance Training to improve static/dynamic balance and to reduce fall risk  [x] Endurance Training to improve activity tolerance during functional mobility   [x] Transfer Training to improve safety and independence with all functional transfers   [x] Gait Training to improve gait mechanics, endurance and assess need for appropriate assistive device  [x] Stair Training in preparation for safe discharge home and/or into the community   [x] Positioning to prevent skin breakdown and contractures  [x] Safety and Education Training   [x] Patient/Caregiver Education   [] HEP  [] Other     PT long term treatment goals are located in above grid    Frequency of treatments: 2-5x/week x 1-2 weeks. Time in  1545  Time out  1605    Total Treatment Time  10 minutes     Evaluation Time includes thorough review of current medical information, gathering information on past medical history/social history and prior level of function, completion of standardized testing/informal observation of tasks, assessment of data and education on plan of care and goals.     CPT codes:  [x] Low Complexity PT evaluation 05182  [] Moderate Complexity PT evaluation 43506  [] High Complexity PT evaluation 78958  [] PT Re-evaluation 39543  [] Gait

## 2022-04-12 NOTE — ANESTHESIA PRE PROCEDURE
Department of Anesthesiology  Preprocedure Note       Name:  Fidelina Old   Age:  64 y.o.  :  1960                                          MRN:  [id number]         Date:  2022      Surgeon: * Surgery not found *    Procedure:     Medications prior to admission:   Prior to Admission medications    Medication Sig Start Date End Date Taking?  Authorizing Provider   aspirin 81 MG EC tablet Take 81 mg by mouth daily   Yes Historical Provider, MD   zinc sulfate (ZINCATE) 220 (50 Zn) MG capsule Take 50 mg by mouth daily   Yes Historical Provider, MD   vitamin D 25 MCG (1000 UT) CAPS Take 1,000 Units by mouth daily   Yes Historical Provider, MD   Ascorbic Acid (VITAMIN C) 1000 MG tablet Take 1,000 mg by mouth 2 times daily   Yes Historical Provider, MD   ELDERBERRY PO Take 1 tablet by mouth daily   Yes Historical Provider, MD   Cyanocobalamin (VITAMIN B12) 1000 MCG TBCR Take 1,000 mcg by mouth daily     Historical Provider, MD       Current medications:    Current Facility-Administered Medications   Medication Dose Route Frequency Provider Last Rate Last Admin    PHENobarbital (LUMINAL) tablet 32.4 mg  32.4 mg Oral TID Myron Mosquera MD        regadenoson (LEXISCAN) injection 0.4 mg  0.4 mg IntraVENous ONCE PRN Abiola Ramos MD        metoprolol succinate (TOPROL XL) extended release tablet 25 mg  25 mg Oral Daily Brenda Gibbs MD   25 mg at 22 0944    magnesium oxide (MAG-OX) tablet 400 mg  400 mg Oral Daily Zelpkal Getting, DO   400 mg at 22 0945    perflutren lipid microspheres (DEFINITY) injection 1.65 mg  1.5 mL IntraVENous ONCE PRN [place], [place]        sodium chloride flush 0.9 % injection 5-40 mL  5-40 mL IntraVENous 2 times per day Penelope Sims MD   10 mL at 04/10/22 0844    sodium chloride flush 0.9 % injection 5-40 mL  5-40 mL IntraVENous PRN Penelope Sims MD        0.9 % sodium chloride infusion   IntraVENous PRN Penelope Sims MD        aspirin EC tablet 81 mg  81 mg Oral Daily Ellis Cruz MD   81 mg at 04/12/22 0945    vitamin B-12 (CYANOCOBALAMIN) tablet 1,000 mcg  1,000 mcg Oral Daily Ellis Cruz MD   1,000 mcg at 04/12/22 0944    enoxaparin (LOVENOX) injection 40 mg  40 mg SubCUTAneous Daily Ellis Cruz MD   40 mg at 04/12/22 0944    thiamine (B-1) injection 100 mg  100 mg IntraVENous Daily Ellis Cruz MD   100 mg at 04/12/22 0944    sodium chloride flush 0.9 % injection 5-40 mL  5-40 mL IntraVENous 2 times per day Ellis Cruz MD   10 mL at 04/11/22 1041    sodium chloride flush 0.9 % injection 5-40 mL  5-40 mL IntraVENous PRN Ellis Cruz MD   10 mL at 04/10/22 1240    0.9 % sodium chloride infusion   IntraVENous PRN Ellis Cruz MD        ondansetron (ZOFRAN-ODT) disintegrating tablet 4 mg  4 mg Oral Q8H PRN Ellis Cruz MD        polyethylene glycol (GLYCOLAX) packet 17 g  17 g Oral Daily PRN Ellis Cruz MD        acetaminophen (TYLENOL) tablet 650 mg  650 mg Oral Q6H PRN Ellis Cruz MD           Allergies:     Allergies   Allergen Reactions    Sulfa Antibiotics      rash       Problem List:    Patient Active Problem List   Diagnosis Code    History of DVT (deep vein thrombosis) Z86.718    Chronic venous insufficiency I87.2    Varicose veins of both lower extremities I83.93    Chest pain R07.9    Alcohol abuse F10.10       Past Medical History:        Diagnosis Date    Chronic venous insufficiency 2/26/2015    History of DVT (deep vein thrombosis) 2/26/2015    Hx of blood clots 2009    LEFT LEG    Inguinal hernia     Varicose veins of lower extremities 2/26/2015       Past Surgical History:        Procedure Laterality Date    HERNIA REPAIR Bilateral 12/028/2013    Inguinal       Social History:    Social History     Tobacco Use    Smoking status: Never Smoker    Smokeless tobacco: Current User     Types: Chew   Substance Use Topics    Alcohol use: Yes     Comment: 4-5 shots daily                                Ready to quit: Not Answered  Counseling given: Not Answered      Vital Signs (Current):   Vitals:    04/11/22 1800 04/11/22 2057 04/12/22 0208 04/12/22 0835   BP: (!) 154/76 (!) 147/89 129/85 137/82   Pulse: 81 71 71 76   Resp:  18     Temp: 35.9 °C (96.7 °F) 36.8 °C (98.2 °F) 36.9 °C (98.4 °F) 36.7 °C (98.1 °F)   TempSrc:  Oral Oral    SpO2:  97% 99% 99%   Weight:       Height:                                                  BP Readings from Last 3 Encounters:   04/12/22 137/82   12/02/13 125/74       NPO Status:  > 8 hours                                                                               BMI:   Wt Readings from Last 3 Encounters:   04/08/22 157 lb (71.2 kg)   11/20/13 165 lb (74.8 kg)     Body mass index is 21.9 kg/m². CBC:   Lab Results   Component Value Date    WBC 5.5 04/11/2022    RBC 4.21 04/11/2022    HGB 14.0 04/11/2022    HCT 41.6 04/11/2022    MCV 98.8 04/11/2022    RDW 12.2 04/11/2022     04/11/2022       CMP:   Lab Results   Component Value Date     04/11/2022    K 4.3 04/11/2022    CL 96 04/11/2022    CO2 26 04/11/2022    BUN 14 04/11/2022    CREATININE 0.9 04/11/2022    GFRAA >60 04/11/2022    LABGLOM >60 04/11/2022    GLUCOSE 93 04/11/2022    PROT 6.7 04/11/2022    CALCIUM 9.6 04/11/2022    BILITOT 1.0 04/11/2022    ALKPHOS 49 04/11/2022     04/11/2022    ALT 74 04/11/2022       POC Tests: No results for input(s): POCGLU, POCNA, POCK, POCCL, POCBUN, POCHEMO, POCHCT in the last 72 hours.     Coags:   Lab Results   Component Value Date    PROTIME 10.3 04/08/2022    PROTIME 11.9 03/03/2011    INR 0.9 04/08/2022    APTT 31.6 04/08/2022       HCG (If Applicable): No results found for: PREGTESTUR, PREGSERUM, HCG, HCGQUANT     ABGs: No results found for: PHART, PO2ART, GBF5DID, EFG8QJF, BEART, K1JPXRCW     Type & Screen (If Applicable):  No results found for: LABABO, LABRH    Drug/Infectious Status (If Applicable):  No results found for: HIV, HEPCAB    COVID-19 Screening (If Applicable): No results found for: COVID19        Anesthesia Evaluation  Patient summary reviewed and Nursing notes reviewed no history of anesthetic complications:   Airway: Mallampati: II  TM distance: >3 FB   Neck ROM: full  Mouth opening: > = 3 FB Dental:          Pulmonary:Negative Pulmonary ROS breath sounds clear to auscultation                             Cardiovascular:            Rhythm: regular  Rate: normal                 ROS comment: 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 Anesthetic plan and risks discussed with patient. Plan discussed with attending.                   ALYSON Yanez - CRNA   4/12/2022

## 2022-04-12 NOTE — PLAN OF CARE
Case discussed with Dr. Noris Cornell. For CHELSI today for further evaluation of MV disease. Patient is now agreeable to diagnostic LHC prior to MV intervention. All risks, benefits of the procedure discussed with the patient, he expressed understanding and is agreeable to proceed. Will schedule for tomorrow. NPO at midnight. CT surgery consult to discuss further treatment options with patient.          Claudetta Grant, [facility]

## 2022-04-12 NOTE — PROGRESS NOTES
Subjective:  Patient was seen on the floor earlier this morning  Admission details noted  Lying comfortably in the bed  No withdrawal symptoms today  No definite chest pain  He feels unsteady when he walks  Family at bedside    Objective:    /82   Pulse 76   Temp 98.1 °F (36.7 °C)   Resp 18   Ht 5' 11\" (1.803 m)   Wt 157 lb (71.2 kg)   SpO2 99%   BMI 21.90 kg/m²   Awake alert oriented  No withdrawal symptoms  Oral mucosa moist  Neck supple no adenopathy  Heart:  RRR systolic murmur at the apex  Lungs:  CTA bilaterally, no wheeze, rales or rhonchi  Abd: bowel sounds present, nontender, nondistended, no masses  Extrem:  No clubbing, cyanosis, or edema    Data reviewed    Assessment:  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

## 2022-04-12 NOTE — PLAN OF CARE
Problem: Falls - Risk of:  Goal: Will remain free from falls  Description: Will remain free from falls  Outcome: Ongoing  Goal: Absence of physical injury  Description: Absence of physical injury  Outcome: Ongoing     Problem: Discharge Planning:  Goal: Discharged to appropriate level of care  Description: Discharged to appropriate level of care  Outcome: Ongoing     Problem: Fluid Volume - Deficit:  Goal: Absence of fluid volume deficit signs and symptoms  Description: Absence of fluid volume deficit signs and symptoms  Outcome: Ongoing     Problem: Nutrition Deficit:  Goal: Ability to achieve adequate nutritional intake will improve  Description: Ability to achieve adequate nutritional intake will improve  Outcome: Ongoing     Problem: Sleep Pattern Disturbance:  Goal: Appears well-rested  Description: Appears well-rested  Outcome: Ongoing     Problem: Musculor/Skeletal Functional Status  Goal: Highest potential functional level  Outcome: Ongoing  Goal: Absence of falls  Outcome: Ongoing     Problem: Tissue Perfusion - Cardiopulmonary, Altered:  Goal: Absence of angina  Description: Absence of angina  Outcome: Ongoing  Goal: Hemodynamic stability will improve  Description: Hemodynamic stability will improve  Outcome: Ongoing

## 2022-04-13 VITALS
OXYGEN SATURATION: 100 % | HEART RATE: 80 BPM | TEMPERATURE: 97.4 F | HEIGHT: 71 IN | DIASTOLIC BLOOD PRESSURE: 78 MMHG | RESPIRATION RATE: 18 BRPM | SYSTOLIC BLOOD PRESSURE: 138 MMHG | BODY MASS INDEX: 21.98 KG/M2 | WEIGHT: 157 LBS

## 2022-04-13 LAB
ABO/RH: NORMAL
ANTIBODY SCREEN: NORMAL

## 2022-04-13 PROCEDURE — 6360000002 HC RX W HCPCS: Performed by: INTERNAL MEDICINE

## 2022-04-13 PROCEDURE — 2580000003 HC RX 258: Performed by: INTERNAL MEDICINE

## 2022-04-13 PROCEDURE — 86900 BLOOD TYPING SEROLOGIC ABO: CPT

## 2022-04-13 PROCEDURE — 2500000003 HC RX 250 WO HCPCS

## 2022-04-13 PROCEDURE — 93458 L HRT ARTERY/VENTRICLE ANGIO: CPT

## 2022-04-13 PROCEDURE — 6360000002 HC RX W HCPCS

## 2022-04-13 PROCEDURE — B2111ZZ FLUOROSCOPY OF MULTIPLE CORONARY ARTERIES USING LOW OSMOLAR CONTRAST: ICD-10-PCS | Performed by: INTERNAL MEDICINE

## 2022-04-13 PROCEDURE — 4A023N7 MEASUREMENT OF CARDIAC SAMPLING AND PRESSURE, LEFT HEART, PERCUTANEOUS APPROACH: ICD-10-PCS | Performed by: INTERNAL MEDICINE

## 2022-04-13 PROCEDURE — 99222 1ST HOSP IP/OBS MODERATE 55: CPT | Performed by: THORACIC SURGERY (CARDIOTHORACIC VASCULAR SURGERY)

## 2022-04-13 PROCEDURE — C1769 GUIDE WIRE: HCPCS

## 2022-04-13 PROCEDURE — 6370000000 HC RX 637 (ALT 250 FOR IP): Performed by: INTERNAL MEDICINE

## 2022-04-13 PROCEDURE — 93458 L HRT ARTERY/VENTRICLE ANGIO: CPT | Performed by: INTERNAL MEDICINE

## 2022-04-13 PROCEDURE — 86850 RBC ANTIBODY SCREEN: CPT

## 2022-04-13 PROCEDURE — 36415 COLL VENOUS BLD VENIPUNCTURE: CPT

## 2022-04-13 PROCEDURE — C1894 INTRO/SHEATH, NON-LASER: HCPCS

## 2022-04-13 PROCEDURE — 2709999900 HC NON-CHARGEABLE SUPPLY

## 2022-04-13 PROCEDURE — C1887 CATHETER, GUIDING: HCPCS

## 2022-04-13 PROCEDURE — 86901 BLOOD TYPING SEROLOGIC RH(D): CPT

## 2022-04-13 RX ORDER — LISINOPRIL 5 MG/1
5 TABLET ORAL DAILY
Status: DISCONTINUED | OUTPATIENT
Start: 2022-04-13 | End: 2022-04-13 | Stop reason: HOSPADM

## 2022-04-13 RX ORDER — SODIUM CHLORIDE 0.9 % (FLUSH) 0.9 %
5-40 SYRINGE (ML) INJECTION PRN
Status: DISCONTINUED | OUTPATIENT
Start: 2022-04-13 | End: 2022-04-13 | Stop reason: HOSPADM

## 2022-04-13 RX ORDER — NICOTINE 21 MG/24HR
1 PATCH, TRANSDERMAL 24 HOURS TRANSDERMAL DAILY
Qty: 30 PATCH | Refills: 3 | Status: SHIPPED | OUTPATIENT
Start: 2022-04-14

## 2022-04-13 RX ORDER — THIAMINE MONONITRATE (VIT B1) 100 MG
100 TABLET ORAL DAILY
Qty: 30 TABLET | Refills: 3 | Status: SHIPPED | OUTPATIENT
Start: 2022-04-13

## 2022-04-13 RX ORDER — SODIUM CHLORIDE 9 MG/ML
25 INJECTION, SOLUTION INTRAVENOUS PRN
Status: DISCONTINUED | OUTPATIENT
Start: 2022-04-13 | End: 2022-04-13 | Stop reason: HOSPADM

## 2022-04-13 RX ORDER — SODIUM CHLORIDE 9 MG/ML
INJECTION, SOLUTION INTRAVENOUS CONTINUOUS
Status: ACTIVE | OUTPATIENT
Start: 2022-04-13 | End: 2022-04-13

## 2022-04-13 RX ORDER — LISINOPRIL 5 MG/1
5 TABLET ORAL DAILY
Qty: 30 TABLET | Refills: 3 | Status: SHIPPED | OUTPATIENT
Start: 2022-04-13 | End: 2022-08-15

## 2022-04-13 RX ORDER — SODIUM CHLORIDE 0.9 % (FLUSH) 0.9 %
5-40 SYRINGE (ML) INJECTION EVERY 12 HOURS SCHEDULED
Status: DISCONTINUED | OUTPATIENT
Start: 2022-04-13 | End: 2022-04-13 | Stop reason: HOSPADM

## 2022-04-13 RX ORDER — ACETAMINOPHEN 325 MG/1
650 TABLET ORAL EVERY 4 HOURS PRN
Status: DISCONTINUED | OUTPATIENT
Start: 2022-04-13 | End: 2022-04-13 | Stop reason: HOSPADM

## 2022-04-13 RX ADMIN — ENOXAPARIN SODIUM 40 MG: 40 INJECTION SUBCUTANEOUS at 08:52

## 2022-04-13 RX ADMIN — PHENOBARBITAL 32.4 MG: 32.4 TABLET ORAL at 08:52

## 2022-04-13 RX ADMIN — THIAMINE HYDROCHLORIDE 100 MG: 100 INJECTION, SOLUTION INTRAMUSCULAR; INTRAVENOUS at 08:52

## 2022-04-13 RX ADMIN — Medication 400 MG: at 08:52

## 2022-04-13 RX ADMIN — CYANOCOBALAMIN TAB 1000 MCG 1000 MCG: 1000 TAB at 08:53

## 2022-04-13 RX ADMIN — SODIUM CHLORIDE, PRESERVATIVE FREE 10 ML: 5 INJECTION INTRAVENOUS at 08:53

## 2022-04-13 RX ADMIN — SODIUM CHLORIDE: 9 INJECTION, SOLUTION INTRAVENOUS at 12:15

## 2022-04-13 RX ADMIN — LISINOPRIL 5 MG: 5 TABLET ORAL at 15:55

## 2022-04-13 RX ADMIN — ASPIRIN 81 MG: 81 TABLET, COATED ORAL at 08:53

## 2022-04-13 RX ADMIN — PHENOBARBITAL 32.4 MG: 32.4 TABLET ORAL at 15:55

## 2022-04-13 RX ADMIN — METOPROLOL SUCCINATE 25 MG: 25 TABLET, EXTENDED RELEASE ORAL at 08:52

## 2022-04-13 NOTE — PLAN OF CARE
Problem: Falls - Risk of:  Goal: Will remain free from falls  Description: Will remain free from falls  4/13/2022 0437 by Maico Leon RN  Outcome: Met This Shift  4/12/2022 1830 by Morris Weeks RN  Outcome: Ongoing  Goal: Absence of physical injury  Description: Absence of physical injury  4/13/2022 0437 by Maico Leon RN  Outcome: Met This Shift  4/12/2022 1830 by Morris Weeks RN  Outcome: Ongoing     Problem: Tissue Perfusion - Cardiopulmonary, Altered:  Goal: Absence of angina  Description: Absence of angina  4/12/2022 1830 by Morris Weeks RN  Outcome: Ongoing  Goal: Hemodynamic stability will improve  Description: Hemodynamic stability will improve  4/12/2022 1830 by Morris Weeks RN  Outcome: Ongoing

## 2022-04-13 NOTE — PROGRESS NOTES
Subjective:  Patient was seen on the floor earlier this morning  Admission details noted  Lying comfortably in the bed  No withdrawal symptoms today  No definite chest pain  He feels unsteady when he walks  Family at bedside    Objective:    BP (!) 152/76   Pulse 78   Temp 97.5 °F (36.4 °C)   Resp 18   Ht 5' 11\" (1.803 m)   Wt 157 lb (71.2 kg)   SpO2 100%   BMI 21.90 kg/m²   Awake alert oriented  No withdrawal symptoms  Oral mucosa moist  Neck supple no adenopathy  Heart:  RRR systolic murmur at the apex  Lungs:  CTA bilaterally, no wheeze, rales or rhonchi  Abd: bowel sounds present, nontender, nondistended, no masses  Extrem:  No clubbing, cyanosis, or edema    Data reviewed    Assessment:  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

## 2022-04-13 NOTE — PLAN OF CARE
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 Cardiothoracic surgery consulted (please see note from Dr. Wes Smith)    · No further work-up from a cardiac standpoint. Okay for discharge from my standpoint. · Aspirin 81 mg daily is reasonable; metoprolol is not indicated and will be switched to lisinopril 5 mg daily for hypertension. A moderate intensity statin is reasonable if he were to eliminate alcohol abuse.   · Follow-up with Dr. Sanjana Mancia in 4-8 weeks and Dr. Wes Smith in 1-2 weeks      Shelton Tomlin MD

## 2022-04-13 NOTE — PROCEDURES
[address]                  [address] [id number],  [facility]                            CARDIAC CATHETERIZATION    PATIENT NAME: Dillon Cortez                      :        1960  MED REC NO:   [id number]                            ROOM:       7404  ACCOUNT NO:   [de-identified]                           ADMIT DATE: 2022  PROVIDER:     Babar Ortega MD    DATE OF PROCEDURE:  2022    LEFT HEART CATHETERIZATION    INDICATION:  Severe mitral regurgitation. REFERRING PROVIDER:  Dr. Eun Cui. DESCRIPTION OF THE PROCEDURE:  After obtaining a signed consent from the  patient, he was brought to the cardiac cath lab in his usual fasting  state. Under sterile condition and under local anesthetic and using  conscious sedation, a 6-Danish Terumo Slender sheath was inserted into  his right radial artery. The patient received 5000 units of the  intravenous heparin. He also received 200 mcg of intraarterial  nitroglycerin and 2.5 mg of diluted verapamil via the right radial  sheath. Then a 5-Danish TIG diagnostic catheter was advanced over a  J-tip wire to the ascending aorta without difficulty. The left main  coronary artery was engaged, and a coronary angiogram was done. Then  the right coronary artery was engaged, and a coronary angiogram was  done. This catheter was exchanged over a guidewire to a 5-Danish  pigtail which was advanced into the left ventricle without difficulty. The left ventricular end-diastolic pressure was measured. Left  ventriculogram was done. There was no significant gradient across the  aortic valve. At the end of the procedure, the pigtail was pulled out. The Terumo Slender sheath was pulled out and a Vasc Band was applied  over the right radial artery with good hemostasis. The patient  tolerated the procedure very well and no complications were noted.   No  significant loss occurred during the procedure. FINDINGS:  HEMODYNAMICS:  Aortic pressure 112/66 mmHg. Left ventricular end-diastolic pressure of 13 mmHg. CORONARY ANATOMY:  Left main:  It is a large and short artery which is calcified but with  no significant angiographic stenosis. LAD:  It is a large artery wrapping around the apex and giving rise to a  large bifurcating diagonal branch, a large first septal , and  several small septal perforators. The LAD was calcified in its ostial  proximal segment. No significant angiographic stenosis noted in the LAD  or its branches. Ramus branch: It is a small branch with no angiographic stenosis noted. Left circumflex: It is a large dominant artery giving rise to a large  bifurcating first obtuse marginal branch, probably an SA mela branch, a  bifurcating small PLV branch and a PDA. No angiographic stenosis noted  in circumflex or its branches. Right coronary artery: It is a small nondominant artery giving rise to  a conus branch. No angiographic stenosis noted. Left ventriculogram revealed prolapse of the mitral valve with severe  mitral regurgitation with an ejection fraction of 60-65%. IMPRESSION:  1. Calcified left main and ostial proximal LAD with no significant  epicardial angiographic stenosis. 2.  LVEDP 13 mmHg. 3.  Prolapse of the mitral valve with severe mitral regurgitation. RECOMMENDATIONS:  Mitral valve repair. PROCEDURE START TIME:  11:04 a.m. PROCEDURE END TIME:  11:17 a.m. CONTRAST USED:  60 mL of Isovue. FLUOROSCOPY TIME:  2.0 minutes. CONSCIOUS SEDATION:  1 mg of intravenous Versed and 50 mcg of  intravenous fentanyl.         Ravindra Pepper MD    D: 04/13/2022 11:34:46       T: 04/13/2022 11:37:17     [id number]  Job#: [id number]     Doc#: [id number]    CC:

## 2022-04-17 NOTE — DISCHARGE SUMMARY
Physician Discharge Summary     Patient ID:  Ghazala Gustafson  [id number]  64 y.o.  1960    Admit date: 4/8/2022    Discharge date and time: 4/13/2022  6:04 PM     Admission Diagnoses: Alcohol abuse [F10.10]  Chest pain [R07.9]  Chest pain, unspecified type [R07.9]    Discharge Diagnoses:   Severe mitral regurgitation with prolapse of the valve  Acute alcohol withdrawal  Patient Active Problem List   Diagnosis    History of DVT (deep vein thrombosis)    Chronic venous insufficiency    Varicose veins of both lower extremities    Chest pain    Alcohol abuse    Nonrheumatic mitral valve regurgitation       Consults: Cardiology cardiothoracic surgery    Procedures: Coronary angiography    Hospital Course: 17-year-old  man admitted through emergency room due to chest pain and difficulty ambulation  He was admitted to monitored floor where he developed acute alcohol withdrawal  He required intravenous Ativan periodically  Thiamine supplements were given  He underwent coronary angiography which showed severe mitral regurgitation without occlusive coronary disease  Seen by cardiothoracic surgery  Outpatient follow-up was recommended  He was discharged home in stable condition    Discharge Exam:  See progress note from today    Disposition: Home  Stable at the time of discharge  Patient Instructions:   Discharge Medication List as of 4/13/2022  4:10 PM      START taking these medications    Details   lisinopril (PRINIVIL;ZESTRIL) 5 MG tablet Take 1 tablet by mouth daily, Disp-30 tablet, R-3Normal      nicotine (NICODERM CQ) 14 MG/24HR Place 1 patch onto the skin daily, Disp-30 patch, R-3Normal      vitamin B-1 (THIAMINE) 100 MG tablet Take 1 tablet by mouth daily, Disp-30 tablet, R-3Normal         CONTINUE these medications which have NOT CHANGED    Details   aspirin 81 MG EC tablet Take 81 mg by mouth dailyHistorical Med      zinc sulfate (ZINCATE) 220 (50 Zn) MG capsule Take 50 mg by mouth dailyHistorical Med vitamin D 25 MCG (1000 UT) CAPS Take 1,000 Units by mouth dailyHistorical Med      Ascorbic Acid (VITAMIN C) 1000 MG tablet Take 1,000 mg by mouth 2 times dailyHistorical Med      ELDERBERRY PO Take 1 tablet by mouth dailyHistorical Med      Cyanocobalamin (VITAMIN B12) 1000 MCG TBCR Take 1,000 mcg by mouth daily Historical Med         STOP taking these medications       apixaban (ELIQUIS) 5 MG TABS tablet Comments:   Reason for Stopping:         magnesium oxide (MAG-) 400 MG tablet Comments:   Reason for Stopping:         acetaminophen (TYLENOL) 325 MG tablet Comments:   Reason for Stopping:             Activity: As tolerated  Diet: General    Follow-up with PCP and cardiothoracic surgery    Note that over 40 minutes was spent in preparing discharge papers, discussing discharge with patient, medication review, etc.    Signed:  Leane Gaucher, MD  4/17/2022  6:10 PM

## 2022-04-25 ASSESSMENT — ENCOUNTER SYMPTOMS
SHORTNESS OF BREATH: 1
ABDOMINAL PAIN: 0
CONSTIPATION: 0
COUGH: 0

## 2022-04-25 NOTE — PROGRESS NOTES
Subjective:      Patient ID: Sanjuana Noel is a 64 y.o. male. CC: chest discomfort    This is a 63 yo male, known to Dr Lewis Moran from recent admission earlier this month. PMHx significant for DVT on eliquis and ETOH abuse. During workup for chest discomfort at recent admission he was found to have severe MR on surface echo with normal LV. CHELSI confirmed Flores's valve w/ severe eccentric MR. He underwent heart cath which showed no significant CAD. Of note his family during admission was inquiring about non-sternotomy options. He presents today to discuss surgical options. HPI    Review of Systems   Constitutional: Negative for chills and fever. Respiratory: Positive for shortness of breath. Negative for cough. Cardiovascular: Negative for chest pain and palpitations. Gastrointestinal: Negative for abdominal pain and constipation. Neurological: Negative for syncope and light-headedness. Objective:   Physical Exam  Constitutional:       General: He is not in acute distress. Cardiovascular:      Rate and Rhythm: Normal rate. Heart sounds: Murmur heard. Pulmonary:      Effort: Pulmonary effort is normal. No respiratory distress. Abdominal:      General: Abdomen is flat. Tenderness: There is no guarding. Skin:     General: Skin is warm and dry. Neurological:      General: No focal deficit present. Mental Status: He is alert and oriented to person, place, and time. Psychiatric:         Mood and Affect: Mood normal.         Behavior: Behavior normal.         Assessment:      Severe MR, normal cors      Plan:      His is thin and healthy and would be best served with a minimally invasive repair which he inquired about and I do not offer. He states he is looking towards [facility]. I told him we would help facilitate in anyway he needed. Follow up PRN.

## 2022-04-26 ENCOUNTER — INITIAL CONSULT (OUTPATIENT)
Dept: CARDIOTHORACIC SURGERY | Age: 62
End: 2022-04-26
Payer: COMMERCIAL

## 2022-04-26 VITALS
DIASTOLIC BLOOD PRESSURE: 91 MMHG | WEIGHT: 160 LBS | HEART RATE: 83 BPM | HEIGHT: 71 IN | SYSTOLIC BLOOD PRESSURE: 136 MMHG | BODY MASS INDEX: 22.4 KG/M2

## 2022-04-26 DIAGNOSIS — I34.0 SEVERE MITRAL REGURGITATION: Primary | ICD-10-CM

## 2022-04-26 PROCEDURE — 99213 OFFICE O/P EST LOW 20 MIN: CPT | Performed by: PHYSICIAN ASSISTANT

## 2022-07-14 DIAGNOSIS — Z98.890 S/P MVR (MITRAL VALVE REPAIR): ICD-10-CM

## 2022-07-14 DIAGNOSIS — Z76.89 ENCOUNTER TO ESTABLISH CARE: Primary | ICD-10-CM

## 2022-07-15 ENCOUNTER — TELEPHONE (OUTPATIENT)
Dept: ADMINISTRATIVE | Age: 62
End: 2022-07-15

## 2022-07-15 NOTE — TELEPHONE ENCOUNTER
There is  referral in the workque per Dr. Surinder Rees dx: establish care; S/P mitral valve repair. Seen in consult by SR on: 4/9/22. Scheduling advise please.

## 2022-08-15 ENCOUNTER — OFFICE VISIT (OUTPATIENT)
Dept: CARDIOLOGY CLINIC | Age: 62
End: 2022-08-15
Payer: COMMERCIAL

## 2022-08-15 VITALS
HEART RATE: 69 BPM | WEIGHT: 166.6 LBS | RESPIRATION RATE: 12 BRPM | HEIGHT: 71 IN | BODY MASS INDEX: 23.32 KG/M2 | SYSTOLIC BLOOD PRESSURE: 102 MMHG | DIASTOLIC BLOOD PRESSURE: 70 MMHG

## 2022-08-15 DIAGNOSIS — I38 VHD (VALVULAR HEART DISEASE): Primary | ICD-10-CM

## 2022-08-15 PROCEDURE — 93000 ELECTROCARDIOGRAM COMPLETE: CPT | Performed by: INTERNAL MEDICINE

## 2022-08-15 PROCEDURE — 99214 OFFICE O/P EST MOD 30 MIN: CPT | Performed by: INTERNAL MEDICINE

## 2022-08-15 RX ORDER — ACETAMINOPHEN 500 MG
500 TABLET ORAL PRN
COMMUNITY

## 2022-08-15 RX ORDER — LISINOPRIL 2.5 MG/1
2.5 TABLET ORAL 2 TIMES DAILY
Qty: 60 TABLET | Refills: 5 | Status: SHIPPED | OUTPATIENT
Start: 2022-08-15

## 2022-08-15 ASSESSMENT — ENCOUNTER SYMPTOMS
WHEEZING: 0
COUGH: 0
NAUSEA: 0
CONSTIPATION: 0
BACK PAIN: 0
DIARRHEA: 0
ABDOMINAL PAIN: 0
SHORTNESS OF BREATH: 0
BLOOD IN STOOL: 0
VOMITING: 0

## 2022-08-15 NOTE — PROGRESS NOTES
OUTPATIENT CARDIOLOGY FOLLOW-UP    HPI:    Name: Daria Ruano    Age: 58 y.o. Primary Care Physician: Favio Quick DO    Date of Service: 8/15/2022    Chief Complaint:   Chief Complaint   Patient presents with    Cardiac Valve Problem     Post hosp at [facility] on 7/1/22,post robotic MVr on 7/1/22 at [facility]-no c/o        History of present illness :   80-year-old male who comes today for follow-up visit. He was seen in consultation at the hospital on 4/8/2022 due to chest pain. He was found to have a degenerative mitral valve with severe mitral regurgitation. He has history of DVTs treated with Eliquis in the past, thrombocytopenia, leukopenia, varicose vein of the left leg and alcohol abuse. Patient underwent robotic mitral valve repair at [facility] on 7/1/2022. Patient has been active. He denies chest discomfort or dyspnea on exertion. He denies palpitations or loss of consciousness. He feels dizzy at times. He denies orthopnea, PND or lower extremity edema. EKG done today revealed sinus rhythm at 69 bpm with first-degree AV block with FL interval 206 ms, left atrial enlargement, left axis deviation and left ventricular hypertrophy. Review of Systems:   Review of Systems   Constitutional:  Negative for chills, fatigue and fever. HENT:  Negative for nosebleeds. Respiratory:  Negative for cough, shortness of breath and wheezing. Gastrointestinal:  Negative for abdominal pain, blood in stool, constipation, diarrhea, nausea and vomiting. Genitourinary:  Negative for dysuria and hematuria. Musculoskeletal:  Negative for back pain, joint swelling and myalgias. Neurological:  Positive for light-headedness. Negative for syncope. Psychiatric/Behavioral:  The patient is not nervous/anxious.          Past Medical History:  Past Medical History:   Diagnosis Date    Chronic venous insufficiency 2/26/2015    History of DVT (deep vein thrombosis) 2/26/2015    Hx of blood clots 2009    LEFT LEG Inguinal hernia     Varicose veins of lower extremities 2/26/2015       Past Surgical History:  Past Surgical History:   Procedure Laterality Date    CARDIAC CATHETERIZATION  04/13/2022    Dr Amaya Harris Bilateral 12/28/2013    Inguinal    MITRAL VALVE REPAIR  07/01/2022    robotic mini mitral valvoplasty [facility]    TRANSESOPHAGEAL ECHOCARDIOGRAM  04/12/2022       Family History:  No family history on file. Social History:  Social History     Socioeconomic History    Marital status: Single     Spouse name: Not on file    Number of children: Not on file    Years of education: Not on file    Highest education level: Not on file   Occupational History    Not on file   Tobacco Use    Smoking status: Never    Smokeless tobacco: Current     Types: Chew   Substance and Sexual Activity    Alcohol use: Yes     Comment: weekends    Drug use: No    Sexual activity: Not on file   Other Topics Concern    Not on file   Social History Narrative    Not on file     Social Determinants of Health     Financial Resource Strain: Not on file   Food Insecurity: Not on file   Transportation Needs: Not on file   Physical Activity: Not on file   Stress: Not on file   Social Connections: Not on file   Intimate Partner Violence: Not on file   Housing Stability: Not on file       Allergies: Allergies   Allergen Reactions    Sulfa Antibiotics      rash       Current Medications:  Current Outpatient Medications   Medication Sig Dispense Refill    metoprolol tartrate (LOPRESSOR) 25 MG tablet Take 25 mg by mouth in the morning and 25 mg before bedtime.       MAGNESIUM PO Take by mouth daily      acetaminophen (TYLENOL) 500 MG tablet Take 500 mg by mouth as needed for Pain      lisinopril (PRINIVIL;ZESTRIL) 5 MG tablet Take 1 tablet by mouth daily 30 tablet 3    nicotine (NICODERM CQ) 14 MG/24HR Place 1 patch onto the skin daily 30 patch 3    vitamin B-1 (THIAMINE) 100 MG tablet Take 1 tablet by mouth daily 30 tablet 3    aspirin 81 MG EC tablet Take 81 mg by mouth daily      zinc sulfate (ZINCATE) 220 (50 Zn) MG capsule Take 50 mg by mouth daily      vitamin D 25 MCG (1000 UT) CAPS Take 1,000 Units by mouth daily      Ascorbic Acid (VITAMIN C) 1000 MG tablet Take 1,000 mg by mouth 2 times daily      ELDERBERRY PO Take 1 tablet by mouth daily      Cyanocobalamin (VITAMIN B12) 1000 MCG TBCR Take 1,000 mcg by mouth daily        No current facility-administered medications for this visit. Physical Exam:  /70   Pulse 69   Resp 12   Ht 5' 11\" (1.803 m)   Wt 166 lb 9.6 oz (75.6 kg)   BMI 23.24 kg/m²   Wt Readings from Last 3 Encounters:   08/15/22 166 lb 9.6 oz (75.6 kg)   04/26/22 160 lb (72.6 kg)   04/08/22 157 lb (71.2 kg)       Physical Exam  Constitutional:       General: He is not in acute distress. Appearance: He is well-developed. HENT:      Head: Normocephalic and atraumatic. Neck:      Vascular: No carotid bruit or JVD. Cardiovascular:      Rate and Rhythm: Normal rate and regular rhythm. Heart sounds: No murmur heard. No friction rub. No gallop. Pulmonary:      Breath sounds: Normal breath sounds. No wheezing or rales. Chest:      Chest wall: No tenderness. Abdominal:      General: Bowel sounds are normal. There is no distension. Palpations: Abdomen is soft. There is no mass. Tenderness: There is no abdominal tenderness. Comments: No abdominal bruit. Musculoskeletal:      Cervical back: Neck supple. Right lower leg: No edema. Left lower leg: No edema. Comments: Varicose veins of the left leg are noted   Skin:     General: Skin is warm and dry. Neurological:      Mental Status: He is alert and oriented to person, place, and time.          Laboratory Tests:  Lab Results   Component Value Date    CREATININE 0.9 04/11/2022    BUN 14 04/11/2022     04/11/2022    K 4.3 04/11/2022    CL 96 (L) 04/11/2022    CO2 26 04/11/2022     Lab Results   Component Value Date/Time    MG 2.0 02/15/2015 12:15 PM     Lab Results   Component Value Date    WBC 5.5 04/11/2022    HGB 14.0 04/11/2022    HCT 41.6 04/11/2022    MCV 98.8 04/11/2022     (L) 04/11/2022     Lab Results   Component Value Date    ALT 74 (H) 04/11/2022     (H) 04/11/2022    ALKPHOS 49 04/11/2022    BILITOT 1.0 04/11/2022     Lab Results   Component Value Date    CKTOTAL 306 (H) 04/25/2014    TROPONINI <0.01 11/15/2015    TROPONINI <0.01 02/15/2015     Lab Results   Component Value Date    INR 0.9 04/08/2022    INR 0.9 11/15/2015    INR 0.9 02/15/2015    PROTIME 10.3 04/08/2022    PROTIME 10.1 11/15/2015    PROTIME 9.8 02/15/2015       Lab Results   Component Value Date    CHOL 225 (H) 04/12/2022     Lab Results   Component Value Date    TRIG 69 04/12/2022     Lab Results   Component Value Date    HDL 68 04/12/2022     Lab Results   Component Value Date    LDLCALC 143 (H) 04/12/2022     Lab Results   Component Value Date    LABVLDL 14 04/12/2022         Cardiac Tests:    Echocardiogram done on 4/13/2021:   Normal left ventricle size. Estimated left ventricle ejection fraction 65+/-5 %. Normal right ventricular size and function. Severe mitral regurgitation is present. Bileaflet prolapse with myxomatous   degeneration is noted. CHELSI done on 4/11/2022:  Degenerative mitral valve with severe mitral regurgitation. Mild tricuspid and aortic regurgitation. Ejection fraction 60%. ASSESSMENT / PLAN:  -Degenerative mitral valve with severe mitral regurgitation: Status post robotic mitral valve repair:  -Hypertension: Low normal blood pressure. -Mild hypercholesterolemia.  -Chronic thrombocytopenia.  -History of alcohol abuse. We will continue patient on aspirin and Lopressor. I will change his lisinopril to 2.5 mg twice daily. Patient was advised to remain hydrated and to avoid exposure to heart weather, hot shower or alcohol. Will follow-up at the office in 1 year.   Will follow-up his mitral valve repair by echocardiogram in 1 year. The patient's current medication list, allergies, problem list and results of all previously ordered testing were reviewed at today's visit. Amy Leroy MD , [facility].   [facility]) Cardiology

## 2023-08-01 ENCOUNTER — OFFICE VISIT (OUTPATIENT)
Dept: SURGERY | Age: 63
End: 2023-08-01

## 2023-08-01 VITALS
BODY MASS INDEX: 22.68 KG/M2 | OXYGEN SATURATION: 96 % | TEMPERATURE: 97.2 F | WEIGHT: 162 LBS | DIASTOLIC BLOOD PRESSURE: 76 MMHG | HEART RATE: 89 BPM | HEIGHT: 71 IN | SYSTOLIC BLOOD PRESSURE: 112 MMHG

## 2023-08-01 DIAGNOSIS — Z12.11 ENCOUNTER FOR SCREENING COLONOSCOPY: Primary | ICD-10-CM

## 2023-08-01 PROCEDURE — NBSRV NON-BILLABLE SERVICE: Performed by: SURGERY

## 2023-08-01 NOTE — PROGRESS NOTES
[facility] Surgery Clinic Note    Assessment/Plan:      Diagnosis Orders   1. Encounter for screening colonoscopy      We will plan for colonoscopy. Return for Colonoscopy. Chief Complaint   Patient presents with    New Patient    Colonoscopy     Colonoscopy screening        PCP: Emerald Fontana DO    HPI: Meron Quintero is a 58 y.o. male who presents in consultation for colonoscopy. He has not had one previously. He denies any issues. He has occasional loose stools pain when he eats. He has no melena or hematochezia. There is no abdominal pain or unintentional weight loss. There are no bowel caliber changes. Past Medical History:   Diagnosis Date    Chronic venous insufficiency 2/26/2015    History of DVT (deep vein thrombosis) 2/26/2015    Hx of blood clots 2009    LEFT LEG    Inguinal hernia     Varicose veins of lower extremities 2/26/2015       Past Surgical History:   Procedure Laterality Date    CARDIAC CATHETERIZATION  04/13/2022    Dr Paula Gloria Bilateral 12/28/2013    Inguinal    MITRAL VALVE REPAIR  07/01/2022    robotic mini mitral valvoplasty [facility]    TRANSESOPHAGEAL ECHOCARDIOGRAM  04/12/2022       Prior to Admission medications    Medication Sig Start Date End Date Taking? Authorizing Provider   MAGNESIUM PO Take by mouth daily   Yes Historical Provider, MD   lisinopril (PRINIVIL;ZESTRIL) 2.5 MG tablet Take 1 tablet by mouth in the morning and 1 tablet in the evening.  8/15/22  Yes Andrea Peralta MD   vitamin B-1 (THIAMINE) 100 MG tablet Take 1 tablet by mouth daily 4/13/22  Yes Amparo Marc MD   aspirin 81 MG EC tablet Take 1 tablet by mouth daily   Yes Historical Provider, MD   zinc sulfate (ZINCATE) 220 (50 Zn) MG capsule Take 1 capsule by mouth daily   Yes Historical Provider, MD   vitamin D 25 MCG (1000 UT) CAPS Take 1 capsule by mouth daily   Yes Historical Provider, MD   Ascorbic Acid (VITAMIN C) 1000 MG tablet Take 1 tablet by mouth 2 times daily   Yes

## 2023-08-25 ENCOUNTER — TELEPHONE (OUTPATIENT)
Dept: SURGERY | Age: 63
End: 2023-08-25

## 2023-08-25 ENCOUNTER — PREP FOR PROCEDURE (OUTPATIENT)
Dept: SURGERY | Age: 63
End: 2023-08-25

## 2023-08-25 PROBLEM — Z12.11 SCREENING FOR COLON CANCER: Status: ACTIVE | Noted: 2023-08-25

## 2023-08-25 NOTE — TELEPHONE ENCOUNTER
Bipin Nunez is scheduled for colonoscopy with Dr Jesus Mendieta on 01-09-24 at [facility]. Patient needs to be NPO after midnight the night before procedure. All surgery instructions were explained to the patient and a surgery letter was also mailed out. MA informed patient that PAT will also be calling to review pre-op instructions and medications. Patient verbalized understanding.   Electronically signed by Shruthi Jacob MA on 8/25/2023 at 10:26 AM

## 2023-08-25 NOTE — TELEPHONE ENCOUNTER
Prior Authorization Form:      DEMOGRAPHICS:                     Patient Name:  Kassie Munson  Patient :  1960            Insurance:  Payor: lGadis Novoa / Plan: Wesley Fothergill X HMO HOMERO / Product Type: *No Product type* /   Insurance ID Number:    Payer/Plan Subscr  Sex Relation Sub. Ins. ID Effective Group Num   1.  [address] [facility] 1960 Male Self [id number] 1/1/15                                     [id number]         DIAGNOSIS & PROCEDURE:                       Procedure/Operation: Colonoscopy           CPT Code: 36444    Diagnosis:  Screening    ICD10 Code: Z12.11    Location:  [facility]    Surgeon:  Dr Quan Shin INFORMATION:                          Date: 24    Time: 8:00 am              Anesthesia:  [facility]                                                       Status:  Outpatient        Special Comments:         Electronically signed by Oksana Barreto MA on 2023 at 10:27 AM

## 2023-09-24 PROBLEM — Z12.11 SCREENING FOR COLON CANCER: Status: RESOLVED | Noted: 2023-08-25 | Resolved: 2023-09-24

## 2023-12-06 ENCOUNTER — OFFICE VISIT (OUTPATIENT)
Dept: CARDIOLOGY CLINIC | Age: 63
End: 2023-12-06
Payer: COMMERCIAL

## 2023-12-06 VITALS
WEIGHT: 162 LBS | RESPIRATION RATE: 18 BRPM | DIASTOLIC BLOOD PRESSURE: 96 MMHG | SYSTOLIC BLOOD PRESSURE: 146 MMHG | BODY MASS INDEX: 22.68 KG/M2 | HEIGHT: 71 IN | HEART RATE: 84 BPM

## 2023-12-06 DIAGNOSIS — Z98.890 STATUS POST MITRAL VALVE REPAIR: ICD-10-CM

## 2023-12-06 DIAGNOSIS — I87.2 CHRONIC VENOUS INSUFFICIENCY: Primary | ICD-10-CM

## 2023-12-06 PROCEDURE — 99214 OFFICE O/P EST MOD 30 MIN: CPT | Performed by: INTERNAL MEDICINE

## 2023-12-06 PROCEDURE — 93000 ELECTROCARDIOGRAM COMPLETE: CPT | Performed by: INTERNAL MEDICINE

## 2023-12-06 RX ORDER — ASPIRIN 325 MG
325 TABLET ORAL DAILY
COMMUNITY

## 2023-12-06 RX ORDER — LISINOPRIL 5 MG/1
5 TABLET ORAL EVERY 12 HOURS SCHEDULED
Qty: 60 TABLET | Refills: 5 | Status: SHIPPED | OUTPATIENT
Start: 2023-12-06

## 2023-12-06 RX ORDER — LISINOPRIL 5 MG/1
5 TABLET ORAL DAILY
COMMUNITY
Start: 2023-11-15

## 2023-12-06 RX ORDER — PNV NO.95/FERROUS FUM/FOLIC AC 28MG-0.8MG
65 TABLET ORAL DAILY
COMMUNITY

## 2023-12-06 ASSESSMENT — ENCOUNTER SYMPTOMS
BACK PAIN: 0
WHEEZING: 0
CONSTIPATION: 0
VOMITING: 0
ABDOMINAL PAIN: 0
BLOOD IN STOOL: 0
COUGH: 0
DIARRHEA: 0
SHORTNESS OF BREATH: 0
NAUSEA: 0

## 2023-12-06 NOTE — PROGRESS NOTES
OUTPATIENT CARDIOLOGY FOLLOW-UP    HPI:    Name: Omero Rose    Age: 61 y.o. Primary Care Physician: Adelina Poole DO    Date of Service: 12/6/2023    Chief Complaint: Over 1 year follow-up visit. History of present illness :   80-year-old male who comes today for over 1 year follow-up visit. He was seen in consultation at the hospital on 4/8/2022 due to chest pain. He was found to have a degenerative mitral valve with severe mitral regurgitation. He has history of DVTs treated with Eliquis in the past, thrombocytopenia, leukopenia, varicose vein of the left leg and alcohol abuse. Patient underwent robotic mitral valve repair at [facility] on 7/1/2022. Patient has been fairly active. He feels good. He denies chest discomfort or dyspnea on exertion. He denies palpitations, dizziness or syncope. He feels occasional lightheadedness. He denies lower extremity edema. EKG done today revealed sinus rhythm at 84 bpm, left atrial enlargement, left anterior fascicular block with artifacts. Review of Systems:   Review of Systems   Constitutional:  Negative for chills, fatigue and fever. HENT:  Negative for nosebleeds. Respiratory:  Negative for cough, shortness of breath and wheezing. Gastrointestinal:  Negative for abdominal pain, blood in stool, constipation, diarrhea, nausea and vomiting. GERD. Genitourinary:  Negative for dysuria and hematuria. Musculoskeletal:  Negative for back pain, joint swelling and myalgias. Neurological:  Positive for light-headedness. Negative for syncope. Psychiatric/Behavioral:  The patient is not nervous/anxious.            Past Medical History:  Past Medical History:   Diagnosis Date    Chronic venous insufficiency 2/26/2015    History of DVT (deep vein thrombosis) 2/26/2015    Hx of blood clots 2009    LEFT LEG    Inguinal hernia     Varicose veins of lower extremities 2/26/2015       Past Surgical History:  Past Surgical History:   Procedure

## 2023-12-29 ENCOUNTER — CLINICAL DOCUMENTATION (OUTPATIENT)
Dept: SURGERY | Age: 63
End: 2023-12-29

## 2023-12-29 NOTE — PROGRESS NOTES
MA spoke with Sarah Barriga at Dr Ankita Medina office who stated that she will check with him for clearance on patient's colonoscopy.   Electronically signed by Anthony Weldon MA on 12/29/2023 at 3:11 PM

## 2024-01-03 NOTE — PROGRESS NOTES
[facility] PRE-ADMISSION TESTING INSTRUCTIONS    The Preadmission Testing patient is instructed accordingly using the following criteria (check applicable):    ARRIVAL INSTRUCTIONS:  [x] Parking the day of Surgery is located in the Main Entrance lot.  Upon entering the door, make an immediate right to the surgery reception desk    [x] Bring photo ID and insurance card    [x] Bring in a copy of Living will or Durable Power of  papers.    [x] Please be sure to arrange for responsible adult to provide transportation to and from the hospital    [x] Please arrange for responsible adult to be with you for the 24 hour period post procedure due to having anesthesia    [x] If you awake am of surgery not feeling well or have temperature >100 please call [phone]    GENERAL INSTRUCTIONS:    [x] Nothing by mouth after midnight, including gum, candy, mints or water    [x] You may brush your teeth, but do not swallow any water    [x] Take medications as instructed with 1-2 oz of water    [x] Stop herbal supplements and vitamins 5 days prior to procedure    [x] Follow preop dosing of blood thinners per physician instructions    [] Take 1/2 dose of evening insulin, but no insulin after midnight    [] No oral diabetic medications after midnight    [] If diabetic and have low blood sugar or feel symptomatic, take 1-2oz apple juice only    [] Bring inhalers day of surgery    [] Bring C-PAP/ Bi-Pap day of surgery    [] Bring urine specimen day of surgery    [x] Shower or bath with soap, lather and rinse well, AM of Surgery, no lotion, powders or creams to surgical site    [x] Follow bowel prep as instructed per surgeon    [x] No tobacco products within 24 hours of surgery     [x] No alcohol or illegal drug use within 24 hours of surgery.    [x] Jewelry, body piercing's, eyeglasses, contact lenses and dentures are not permitted into surgery (bring cases)      [] Please do not wear any nail polish,

## 2024-01-08 ENCOUNTER — TELEPHONE (OUTPATIENT)
Dept: CARDIOLOGY CLINIC | Age: 64
End: 2024-01-08

## 2024-01-08 ENCOUNTER — CLINICAL DOCUMENTATION (OUTPATIENT)
Dept: SURGERY | Age: 64
End: 2024-01-08

## 2024-01-08 PROBLEM — Z12.11 SCREENING FOR COLON CANCER: Status: ACTIVE | Noted: 2023-08-25

## 2024-01-08 NOTE — PROGRESS NOTES
Oksana from Dr Peter's office called and stated that patient is cleared per Dr Peter, however, patient needs to have Amoxicillin 30 - 60 minutes prior to procedure.  MA informed   Dr Rudolph  Electronically signed by Ana Burnham MA on 1/8/2024 at 3:28 PM

## 2024-01-08 NOTE — TELEPHONE ENCOUNTER
Ana called Dr. Dr. Rudolph office.  Asking about clearance for Juan Francisco who is scheduled for colonoscopy 1/9/24.    Per Dr. Peter, cleared for colonoscopy.  Requires amoxicillin 30-60 minutes prior.

## 2024-01-09 ENCOUNTER — HOSPITAL ENCOUNTER (OUTPATIENT)
Age: 64
Setting detail: OUTPATIENT SURGERY
Discharge: HOME | End: 2024-01-09
Attending: SURGERY | Admitting: SURGERY
Payer: COMMERCIAL

## 2024-01-09 ENCOUNTER — ANESTHESIA (OUTPATIENT)
Dept: ENDOSCOPY | Age: 64
End: 2024-01-09
Payer: COMMERCIAL

## 2024-01-09 ENCOUNTER — ANESTHESIA EVENT (OUTPATIENT)
Dept: ENDOSCOPY | Age: 64
End: 2024-01-09
Payer: COMMERCIAL

## 2024-01-09 VITALS
DIASTOLIC BLOOD PRESSURE: 78 MMHG | BODY MASS INDEX: 24.44 KG/M2 | HEIGHT: 69 IN | WEIGHT: 165 LBS | OXYGEN SATURATION: 97 % | HEART RATE: 74 BPM | RESPIRATION RATE: 16 BRPM | SYSTOLIC BLOOD PRESSURE: 120 MMHG

## 2024-01-09 DIAGNOSIS — Z12.11 SCREENING FOR COLON CANCER: ICD-10-CM

## 2024-01-09 PROCEDURE — 2709999900 HC NON-CHARGEABLE SUPPLY: Performed by: SURGERY

## 2024-01-09 PROCEDURE — 6360000002 HC RX W HCPCS: Performed by: NURSE ANESTHETIST, CERTIFIED REGISTERED

## 2024-01-09 PROCEDURE — 3700000000 HC ANESTHESIA ATTENDED CARE: Performed by: SURGERY

## 2024-01-09 PROCEDURE — 2580000003 HC RX 258: Performed by: NURSE ANESTHETIST, CERTIFIED REGISTERED

## 2024-01-09 PROCEDURE — 6360000002 HC RX W HCPCS: Performed by: SURGERY

## 2024-01-09 PROCEDURE — 3609027000 HC COLONOSCOPY: Performed by: SURGERY

## 2024-01-09 PROCEDURE — 3700000001 HC ADD 15 MINUTES (ANESTHESIA): Performed by: SURGERY

## 2024-01-09 PROCEDURE — 2580000003 HC RX 258: Performed by: SURGERY

## 2024-01-09 PROCEDURE — 7100000011 HC PHASE II RECOVERY - ADDTL 15 MIN: Performed by: SURGERY

## 2024-01-09 PROCEDURE — 7100000010 HC PHASE II RECOVERY - FIRST 15 MIN: Performed by: SURGERY

## 2024-01-09 PROCEDURE — 45378 DIAGNOSTIC COLONOSCOPY: CPT | Performed by: SURGERY

## 2024-01-09 RX ORDER — PROPOFOL 10 MG/ML
INJECTION, EMULSION INTRAVENOUS PRN
Status: DISCONTINUED | OUTPATIENT
Start: 2024-01-09 | End: 2024-01-09 | Stop reason: SDUPTHER

## 2024-01-09 RX ORDER — SODIUM CHLORIDE 9 MG/ML
INJECTION, SOLUTION INTRAVENOUS CONTINUOUS PRN
Status: DISCONTINUED | OUTPATIENT
Start: 2024-01-09 | End: 2024-01-09 | Stop reason: SDUPTHER

## 2024-01-09 RX ADMIN — SODIUM CHLORIDE: 9 INJECTION, SOLUTION INTRAVENOUS at 07:47

## 2024-01-09 RX ADMIN — PROPOFOL 120 MCG/KG/MIN: 10 INJECTION, EMULSION INTRAVENOUS at 08:25

## 2024-01-09 RX ADMIN — PROPOFOL 150 MG: 10 INJECTION, EMULSION INTRAVENOUS at 08:24

## 2024-01-09 RX ADMIN — SODIUM CHLORIDE 3000 MG: 9 INJECTION, SOLUTION INTRAVENOUS at 07:54

## 2024-01-09 ASSESSMENT — LIFESTYLE VARIABLES: SMOKING_STATUS: 0

## 2024-01-09 ASSESSMENT — PAIN SCALES - GENERAL: PAINLEVEL_OUTOF10: 0

## 2024-01-09 NOTE — ANESTHESIA PRE PROCEDURE
Department of Anesthesiology  Preprocedure Note       Name:  Juan Francisco Jaquez   Age:  63 y.o.  :  1960                                          MRN:  [id number]         Date:  2024      Surgeon: Surgeon(s):  Umer Rudolph MD    Procedure: Procedure(s):  COLORECTAL CANCER SCREENING, NOT HIGH RISK    Medications prior to admission:   Prior to Admission medications    Medication Sig Start Date End Date Taking? Authorizing Provider   aspirin 325 MG tablet Take 1 tablet by mouth daily 1/2 tablet once daily    Vito Corona MD   Ferrous Sulfate (IRON) 325 (65 Fe) MG TABS Take 65 mg by mouth daily    Vito Corona MD   lisinopril (PRINIVIL;ZESTRIL) 5 MG tablet Take 1 tablet by mouth every 12 hours 23   Cassidy Peter MD   metoprolol tartrate (LOPRESSOR) 25 MG tablet Take 25 mg by mouth in the morning and 25 mg before bedtime.  Patient not taking: Reported on 2023    Vito Corona MD   MAGNESIUM PO Take by mouth daily    Vito Corona MD   acetaminophen (TYLENOL) 500 MG tablet Take 500 mg by mouth as needed for Pain  Patient not taking: Reported on 2023    Vito Corona MD   nicotine (NICODERM CQ) 14 MG/24HR Place 1 patch onto the skin daily  Patient not taking: Reported on 2023   Joselin Redman MD   vitamin B-1 (THIAMINE) 100 MG tablet Take 1 tablet by mouth daily 22   Joselin Redman MD   aspirin 81 MG EC tablet Take 1 tablet by mouth daily  Patient not taking: Reported on 2023    Vito Corona MD   zinc sulfate (ZINCATE) 220 (50 Zn) MG capsule Take 1 capsule by mouth daily    Vito Corona MD   vitamin D 25 MCG (1000 UT) CAPS Take 1 capsule by mouth daily    Vito Corona MD   Ascorbic Acid (VITAMIN C) 1000 MG tablet Take 1 tablet by mouth 2 times daily    Vito Corona MD   ELDERBERRY PO Take 1 tablet by mouth daily    Vito Corona MD   Cyanocobalamin (VITAMIN B12) 1000 MCG TBCR Take 1,000 mcg by

## 2024-01-09 NOTE — H&P
[facility] Surgery Clinic Note     Assessment/Plan:        Diagnosis Orders   1. Encounter for screening colonoscopy        We will plan for colonoscopy.                Return for Colonoscopy.             Chief Complaint   Patient presents with    New Patient    Colonoscopy       Colonoscopy screening          PCP: Avni Leung DO     HPI: Juan Francisco Jaquez is a 62 y.o. male who presents in consultation for colonoscopy.  He has not had one previously.  He denies any issues.  He has occasional loose stools pain when he eats.  He has no melena or hematochezia.  There is no abdominal pain or unintentional weight loss.  There are no bowel caliber changes.     Past Medical History        Past Medical History:   Diagnosis Date    Chronic venous insufficiency 2/26/2015    History of DVT (deep vein thrombosis) 2/26/2015    Hx of blood clots 2009     LEFT LEG    Inguinal hernia      Varicose veins of lower extremities 2/26/2015            Past Surgical History         Past Surgical History:   Procedure Laterality Date    CARDIAC CATHETERIZATION   04/13/2022     Dr peter    HERNIA REPAIR Bilateral 12/28/2013     Inguinal    MITRAL VALVE REPAIR   07/01/2022     robotic mini mitral valvoplasty [facility]    TRANSESOPHAGEAL ECHOCARDIOGRAM   04/12/2022            Home Medications           Prior to Admission medications    Medication Sig Start Date End Date Taking? Authorizing Provider   MAGNESIUM PO Take by mouth daily     Yes Historical Provider, MD   lisinopril (PRINIVIL;ZESTRIL) 2.5 MG tablet Take 1 tablet by mouth in the morning and 1 tablet in the evening. 8/15/22   Yes Cassidy Peter MD   vitamin B-1 (THIAMINE) 100 MG tablet Take 1 tablet by mouth daily 4/13/22   Yes Joselin Redman MD   aspirin 81 MG EC tablet Take 1 tablet by mouth daily     Yes Historical Provider, MD   zinc sulfate (ZINCATE) 220 (50 Zn) MG capsule Take 1 capsule by mouth daily     Yes Historical Provider, MD   vitamin D 25 MCG (1000 UT) CAPS Take 1 capsule by

## 2024-01-09 NOTE — ANESTHESIA POSTPROCEDURE EVALUATION
Department of Anesthesiology  Postprocedure Note    Patient: Juan Francisco Jaquez  MRN: [id number]  YOB: 1960  Date of evaluation: 1/9/2024    Procedure Summary       Date: 01/09/24 Room / Location: [address]    Anesthesia Start: 0821 Anesthesia Stop: 0839    Procedure: COLORECTAL CANCER SCREENING, NOT HIGH RISK Diagnosis:       Screening for colon cancer      (Screening for colon cancer [Z12.11])    Surgeons: Umer Rudolph MD Responsible Provider: Patricia Cisneros DO    Anesthesia Type: MAC ASA Status: 3            Anesthesia Type: No value filed.    Antione Phase I: Antione Score: 10    Antione Phase II: Antione Score: 10    Anesthesia Post Evaluation    Patient location during evaluation: PACU  Patient participation: complete - patient participated  Level of consciousness: awake and alert  Nausea & Vomiting: no vomiting and no nausea  Cardiovascular status: hemodynamically stable  Respiratory status: acceptable and spontaneous ventilation  Hydration status: stable  Pain management: adequate    No notable events documented.

## 2024-01-14 ENCOUNTER — APPOINTMENT (OUTPATIENT)
Dept: GENERAL RADIOLOGY | Age: 64
End: 2024-01-14
Payer: COMMERCIAL

## 2024-01-14 ENCOUNTER — APPOINTMENT (OUTPATIENT)
Dept: CT IMAGING | Age: 64
End: 2024-01-14
Payer: COMMERCIAL

## 2024-01-14 VITALS
BODY MASS INDEX: 24.14 KG/M2 | RESPIRATION RATE: 18 BRPM | SYSTOLIC BLOOD PRESSURE: 165 MMHG | WEIGHT: 163 LBS | HEIGHT: 69 IN | DIASTOLIC BLOOD PRESSURE: 89 MMHG | TEMPERATURE: 97.7 F | OXYGEN SATURATION: 98 % | HEART RATE: 73 BPM

## 2024-01-14 LAB
ALBUMIN SERPL-MCNC: 4.5 G/DL (ref 3.5–5.2)
ALP SERPL-CCNC: 45 U/L (ref 40–129)
ALT SERPL-CCNC: 91 U/L (ref 0–40)
AMPHET UR QL SCN: NEGATIVE
ANION GAP SERPL CALCULATED.3IONS-SCNC: 11 MMOL/L (ref 7–16)
AST SERPL-CCNC: 44 U/L (ref 0–39)
BARBITURATES UR QL SCN: NEGATIVE
BENZODIAZ UR QL: NEGATIVE
BILIRUB SERPL-MCNC: 0.4 MG/DL (ref 0–1.2)
BUN SERPL-MCNC: 10 MG/DL (ref 6–23)
BUPRENORPHINE UR QL: NEGATIVE
CALCIUM SERPL-MCNC: 9.3 MG/DL (ref 8.6–10.2)
CANNABINOIDS UR QL SCN: NEGATIVE
CHLORIDE SERPL-SCNC: 97 MMOL/L (ref 98–107)
CO2 SERPL-SCNC: 26 MMOL/L (ref 22–29)
COCAINE UR QL SCN: NEGATIVE
CREAT SERPL-MCNC: 0.7 MG/DL (ref 0.7–1.2)
ERYTHROCYTE [DISTWIDTH] IN BLOOD BY AUTOMATED COUNT: 11.6 % (ref 11.5–15)
ETHANOLAMINE SERPL-MCNC: <10 MG/DL
FENTANYL UR QL: NEGATIVE
GFR SERPL CREATININE-BSD FRML MDRD: >60 ML/MIN/1.73M2
GLUCOSE SERPL-MCNC: 106 MG/DL (ref 74–99)
HCT VFR BLD AUTO: 43 % (ref 37–54)
HGB BLD-MCNC: 14.6 G/DL (ref 12.5–16.5)
INR PPP: 1
LACTATE BLDV-SCNC: 1 MMOL/L (ref 0.5–2.2)
MCH RBC QN AUTO: 33.6 PG (ref 26–35)
MCHC RBC AUTO-ENTMCNC: 34 G/DL (ref 32–34.5)
MCV RBC AUTO: 99.1 FL (ref 80–99.9)
METHADONE UR QL: NEGATIVE
OPIATES UR QL SCN: NEGATIVE
OXYCODONE UR QL SCN: NEGATIVE
PCP UR QL SCN: NEGATIVE
PLATELET # BLD AUTO: 184 K/UL (ref 130–450)
PMV BLD AUTO: 9.4 FL (ref 7–12)
POTASSIUM SERPL-SCNC: 4.3 MMOL/L (ref 3.5–5)
PROT SERPL-MCNC: 7.3 G/DL (ref 6.4–8.3)
PROTHROMBIN TIME: 10.7 SEC (ref 9.3–12.4)
RBC # BLD AUTO: 4.34 M/UL (ref 3.8–5.8)
SODIUM SERPL-SCNC: 134 MMOL/L (ref 132–146)
TEST INFORMATION: NORMAL
WBC OTHER # BLD: 8.4 K/UL (ref 4.5–11.5)

## 2024-01-14 PROCEDURE — 85027 COMPLETE CBC AUTOMATED: CPT

## 2024-01-14 PROCEDURE — 71250 CT THORAX DX C-: CPT

## 2024-01-14 PROCEDURE — 72170 X-RAY EXAM OF PELVIS: CPT

## 2024-01-14 PROCEDURE — 80053 COMPREHEN METABOLIC PANEL: CPT

## 2024-01-14 PROCEDURE — 71045 X-RAY EXAM CHEST 1 VIEW: CPT

## 2024-01-14 PROCEDURE — 80307 DRUG TEST PRSMV CHEM ANLYZR: CPT

## 2024-01-14 PROCEDURE — 72128 CT CHEST SPINE W/O DYE: CPT

## 2024-01-14 PROCEDURE — 6370000000 HC RX 637 (ALT 250 FOR IP): Performed by: NURSE PRACTITIONER

## 2024-01-14 PROCEDURE — 83605 ASSAY OF LACTIC ACID: CPT

## 2024-01-14 PROCEDURE — 85610 PROTHROMBIN TIME: CPT

## 2024-01-14 PROCEDURE — 99284 EMERGENCY DEPT VISIT MOD MDM: CPT

## 2024-01-14 PROCEDURE — G0480 DRUG TEST DEF 1-7 CLASSES: HCPCS

## 2024-01-14 RX ORDER — HYDROCODONE BITARTRATE AND ACETAMINOPHEN 5; 325 MG/1; MG/1
1 TABLET ORAL ONCE
Status: COMPLETED | OUTPATIENT
Start: 2024-01-14 | End: 2024-01-14

## 2024-01-14 RX ORDER — LABETALOL HYDROCHLORIDE 5 MG/ML
10 INJECTION, SOLUTION INTRAVENOUS EVERY 4 HOURS PRN
Status: DISCONTINUED | OUTPATIENT
Start: 2024-01-14 | End: 2024-01-15 | Stop reason: HOSPADM

## 2024-01-14 RX ORDER — HYDRALAZINE HYDROCHLORIDE 20 MG/ML
10 INJECTION INTRAMUSCULAR; INTRAVENOUS EVERY 4 HOURS PRN
Status: DISCONTINUED | OUTPATIENT
Start: 2024-01-14 | End: 2024-01-15 | Stop reason: HOSPADM

## 2024-01-14 RX ADMIN — HYDROCODONE BITARTRATE AND ACETAMINOPHEN 1 TABLET: 5; 325 TABLET ORAL at 21:05

## 2024-01-14 ASSESSMENT — PAIN DESCRIPTION - ORIENTATION: ORIENTATION: LEFT

## 2024-01-14 ASSESSMENT — PAIN SCALES - GENERAL: PAINLEVEL_OUTOF10: 9

## 2024-01-14 ASSESSMENT — PAIN DESCRIPTION - DESCRIPTORS: DESCRIPTORS: ACHING

## 2024-01-14 ASSESSMENT — PAIN DESCRIPTION - LOCATION: LOCATION: BACK

## 2024-01-14 NOTE — ED PROVIDER NOTES
ED Physician   HPI:  1/14/24, Time: 6:28 PM JEFERSON Jaquez is a 63 y.o. male presenting to the ED for mechanical fall with injury.  Patient presents to the emergency department states that earlier this morning around 10 AM he slipped in the driveway landing straight on his back.  Patient reports pain primarily with movement to his left mid back.  He denies striking his head, denies loss of consciousness and denied feeling weak or dizzy prior to this fall.  Patient also denies any unusual nausea or vomiting as well as no injuries to his upper or lower extremities.  Patient denies any headache denies any lightheadedness or dizziness or blurry or double vision.  Patient also without any chest pain, shortness of breath or abdominal pain.  Patient reports taking over-the-counter meds prior to arrival and declines needing anything here right now.    Review of Systems:   A complete review of systems was performed and pertinent positives and negatives are stated within HPI, all other systems reviewed and are negative.          --------------------------------------------- PAST HISTORY ---------------------------------------------  Past Medical History:  has a past medical history of Chronic venous insufficiency, History of DVT (deep vein thrombosis), Hx of blood clots, Inguinal hernia, Screening for colon cancer, and Varicose veins of lower extremities.    Past Surgical History:  has a past surgical history that includes hernia repair (Bilateral, 12/28/2013); transesophageal echocardiogram (04/12/2022); Cardiac catheterization (04/13/2022); Mitral valve repair (07/01/2022); Colonoscopy; and Colonoscopy (N/A, 1/9/2024).    Social History:  reports that he has never smoked. His smokeless tobacco use includes chew. He reports current alcohol use of about 12.0 standard drinks of alcohol per week. He reports that he does not use drugs.    Family History: family history is not on file.     The patient’s home

## 2024-01-15 ENCOUNTER — HOSPITAL ENCOUNTER (EMERGENCY)
Age: 64
Discharge: HOME | End: 2024-01-15
Attending: STUDENT IN AN ORGANIZED HEALTH CARE EDUCATION/TRAINING PROGRAM
Payer: COMMERCIAL
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
## 2024-01-15 DIAGNOSIS — S22.42XA CLOSED FRACTURE OF MULTIPLE RIBS OF LEFT SIDE, INITIAL ENCOUNTER: Primary | ICD-10-CM

## 2024-01-15 DIAGNOSIS — S22.000A COMPRESSION FRACTURE OF THORACIC VERTEBRA, UNSPECIFIED THORACIC VERTEBRAL LEVEL, INITIAL ENCOUNTER (HCC): ICD-10-CM

## 2024-01-15 RX ORDER — METHOCARBAMOL 500 MG/1
500 TABLET, FILM COATED ORAL EVERY 6 HOURS PRN
Qty: 40 TABLET | Refills: 0 | Status: SHIPPED | OUTPATIENT
Start: 2024-01-15 | End: 2024-01-25

## 2024-01-15 RX ORDER — LIDOCAINE 50 MG/G
1 PATCH TOPICAL DAILY
Qty: 10 PATCH | Refills: 0 | Status: SHIPPED | OUTPATIENT
Start: 2024-01-15 | End: 2024-01-25

## 2024-01-15 RX ORDER — OXYCODONE HYDROCHLORIDE AND ACETAMINOPHEN 5; 325 MG/1; MG/1
1 TABLET ORAL EVERY 6 HOURS PRN
Qty: 12 TABLET | Refills: 0 | Status: SHIPPED | OUTPATIENT
Start: 2024-01-15 | End: 2024-01-16

## 2024-01-15 NOTE — CONSULTS
TRAUMA HISTORY & PHYSICAL  Resident   1/14/2024  11:10 PM    Chief Complaint   Patient presents with    Fall     Pt slipped in driveway this AM. C/o L sided mid back pain. Denies hitting head. -thinners         HPI: Juan Francisco Jaquez is a 63 y.o. male who presents after mechanical fall earlier this morning at 10 AM when he was holding still.  Patient is that he fell on his back.  He says he did not hit his head.  He says he did not lose any consciousness.  He went afterwards his sisters home, and then went back home and had increased pain.  Which is why he decided to come into the ED. currently, patient only complains of left flank pain.  He does not have any head pain, no abdominal pain, no numbness or tingling anywhere, no neuromuscular deficits.  He also denies any midline tenderness.    Medical history includes hypertension.  Surgical history include mitral valvuloplasty.  He is on any blood thinning medications.  He is on aspirin 81      PRIMARY SURVEY    AIRWAY:   Airway normal  EMS ETT Absent   Noisy respirations Absent   Retractions: Absent   Vomiting/bleeding: Absent     BREATHING:    Midaxillary breath sound left:  Present  Midaxillary breath sound right: Present  Cough sound intensity:  Good  Respiratory rate: 22  FiO2: RA  SpO2: 98  SMI: 2500    CIRCULATION:   Femoral pulse rate: within normal limits  Femoral pulse intensity: present  Palpebral conjunctiva: Pink     BP      P     SpO2       T      F    Patient Vitals for the past 8 hrs:   BP Temp Temp src Pulse Resp SpO2 Height Weight   01/14/24 2256 (!) 165/89 -- -- 73 18 98 % -- --   01/14/24 1826 (!) 159/105 -- -- -- 16 -- 1.753 m (5' 9\") 73.9 kg (163 lb)   01/14/24 1816 -- 97.7 °F (36.5 °C) Temporal 93 -- 100 % -- --       FAST EXAM: Not performed    Central Nervous System    GCS Initial 15 minutes   Eye  Motor  Verbal 4 - Opens eyes on own  6 - Follows simple motor commands  5 - Alert and oriented 4 - Opens eyes on own  6 - Follows simple motor

## 2024-01-15 NOTE — DISCHARGE INSTRUCTIONS
of medication you are taking does not control your pain and any side effects that you may be having.      CALL 911 OR YOUR LOCAL EMERGENCY SERVICE:  --If you take too much medication  --If you have trouble breathing or shortness of breath  --A child has taken this medication.    WORK:  You may not return to work until you receive follow-up with the Trauma Clinic or clearance by all consultants.    Call the trauma clinic for any of the following or for questions/concerns;  --fever over 101F  --redness, swelling, hardness or warmth at the wound site(s).  --Unrelieved nausea/vomiting  --Foul smelling or cloudy drainage at the wound site(s)  --Unrelieved pain or increase in pain  --Increase in shortness of breath    Follow-up:  Trauma Clinic: [phone]--press option 2  [facility]  [address] [place] [address]  [id number]      [employer] is a secure online portal that allows you to access your electronic medical record and send messages to your doctor directly without going to the clinic or picking up the phone. You can also access your test results, communicate with your doctor, pay online, manage your appointments, and request prescription refills.     To sign up for [employer], please scan the QR code below.

## 2024-01-15 NOTE — PROGRESS NOTES
Trauma Tertiary Survey    Admit Date: (Not on file)  Hospital day 0    CC:  Mechanical fall    Alcohol pre-screening:  Men: How many times in the past year have you had 5 or more drinks in a day?  none  How much do you drink on a daily basis? 2 shots daily    Drug Pre-screening:    How many times in the past year have you used a recreational drug or used a prescription medication for non medical reasons?  No    Mood Prescreening:    During the past two weeks, have you been bothered by little interest or pleasure doing things?  No  During the past two weeks, have you been bothered by feeling down, depressed or hopeless?  No      Scheduled Meds:  Continuous Infusions:  PRN Meds:hydrALAZINE, labetalol    Subjective:     Doing well overall. Pain controlled. Still no neuromuscualr deficits or sensation loss. On room air.     Objective:   Patient Vitals for the past 8 hrs:   BP Temp Temp src Pulse Resp SpO2 Height Weight   01/14/24 2256 (!) 165/89 -- -- 73 18 98 % -- --   01/14/24 1826 (!) 159/105 -- -- -- 16 -- 1.753 m (5' 9\") 73.9 kg (163 lb)   01/14/24 1816 -- 97.7 °F (36.5 °C) Temporal 93 -- 100 % -- --       No intake/output data recorded.  No intake/output data recorded.    Past Medical History:   Diagnosis Date    Chronic venous insufficiency 02/26/2015    History of DVT (deep vein thrombosis) 02/26/2015    Hx of blood clots 01/01/2009    LEFT LEG    Inguinal hernia     Screening for colon cancer 2024    Varicose veins of lower extremities 02/26/2015       @[email or web address]@    Radiology:  XR PELVIS (1-2 VIEWS)   Final Result   No acute abnormality of the pelvis.         XR CHEST PORTABLE   Final Result   No acute process.         CT CHEST WO CONTRAST   Final Result   Addendum (preliminary) 1 of 1   ADDENDUM:   An acute, nondisplaced fracture of the left posterior-lateral 6th rib is    also   seen, better seen on the accompanying CT thoracic spine exam.      An additional acute, nondisplaced fracture of the medial ends of

## 2024-01-16 RX ORDER — HYDROCODONE BITARTRATE AND ACETAMINOPHEN 5; 325 MG/1; MG/1
1 TABLET ORAL EVERY 6 HOURS PRN
Qty: 12 TABLET | Refills: 0 | Status: SHIPPED | OUTPATIENT
Start: 2024-01-16 | End: 2024-01-19

## 2024-02-07 PROBLEM — Z12.11 SCREENING FOR COLON CANCER: Status: RESOLVED | Noted: 2023-08-25 | Resolved: 2024-02-07

## 2024-04-11 ENCOUNTER — TELEPHONE (OUTPATIENT)
Dept: CARDIOLOGY | Age: 64
End: 2024-04-11

## 2024-05-23 ENCOUNTER — APPOINTMENT (OUTPATIENT)
Dept: CT IMAGING | Age: 64
End: 2024-05-23
Attending: STUDENT IN AN ORGANIZED HEALTH CARE EDUCATION/TRAINING PROGRAM
Payer: COMMERCIAL

## 2024-05-23 ENCOUNTER — APPOINTMENT (OUTPATIENT)
Dept: GENERAL RADIOLOGY | Age: 64
End: 2024-05-23
Payer: COMMERCIAL

## 2024-05-23 ENCOUNTER — HOSPITAL ENCOUNTER (INPATIENT)
Age: 64
LOS: 6 days | Discharge: OTHER INSTITUTION | End: 2024-05-30
Attending: STUDENT IN AN ORGANIZED HEALTH CARE EDUCATION/TRAINING PROGRAM | Admitting: INTERNAL MEDICINE
Payer: COMMERCIAL

## 2024-05-23 DIAGNOSIS — F10.10 ALCOHOL ABUSE: ICD-10-CM

## 2024-05-23 DIAGNOSIS — I26.99 RIGHT PULMONARY EMBOLUS (HCC): Primary | ICD-10-CM

## 2024-05-23 DIAGNOSIS — I26.99 PULMONARY EMBOLISM ON RIGHT (HCC): ICD-10-CM

## 2024-05-23 DIAGNOSIS — R42 LIGHTHEADEDNESS: ICD-10-CM

## 2024-05-23 LAB
ALBUMIN SERPL-MCNC: 4.7 G/DL (ref 3.5–5.2)
ALP SERPL-CCNC: 64 U/L (ref 40–129)
ALT SERPL-CCNC: 79 U/L (ref 0–40)
AMORPH SED URNS QL MICRO: PRESENT
ANION GAP SERPL CALCULATED.3IONS-SCNC: 20 MMOL/L (ref 7–16)
APAP SERPL-MCNC: <5 UG/ML (ref 10–30)
AST SERPL-CCNC: 139 U/L (ref 0–39)
B PARAP IS1001 DNA NPH QL NAA+NON-PROBE: NOT DETECTED
B PERT DNA SPEC QL NAA+PROBE: NOT DETECTED
BACTERIA URNS QL MICRO: ABNORMAL
BASOPHILS # BLD: 0.04 K/UL (ref 0–0.2)
BASOPHILS NFR BLD: 1 % (ref 0–2)
BILIRUB SERPL-MCNC: 0.8 MG/DL (ref 0–1.2)
BILIRUB UR QL STRIP: NEGATIVE
BNP SERPL-MCNC: 116 PG/ML (ref 0–125)
BUN SERPL-MCNC: 11 MG/DL (ref 6–23)
C PNEUM DNA NPH QL NAA+NON-PROBE: NOT DETECTED
CALCIUM SERPL-MCNC: 9.8 MG/DL (ref 8.6–10.2)
CHLORIDE SERPL-SCNC: 97 MMOL/L (ref 98–107)
CLARITY UR: CLEAR
CO2 SERPL-SCNC: 23 MMOL/L (ref 22–29)
COLOR UR: YELLOW
CREAT SERPL-MCNC: 0.9 MG/DL (ref 0.7–1.2)
D-DIMER QUANTITATIVE: 1065 NG/ML DDU (ref 0–230)
EOSINOPHIL # BLD: 0.03 K/UL (ref 0.05–0.5)
EOSINOPHILS RELATIVE PERCENT: 1 % (ref 0–6)
ERYTHROCYTE [DISTWIDTH] IN BLOOD BY AUTOMATED COUNT: 13.6 % (ref 11.5–15)
ETHANOLAMINE SERPL-MCNC: 88 MG/DL (ref 0–0.08)
FLUAV RNA NPH QL NAA+NON-PROBE: NOT DETECTED
FLUBV RNA NPH QL NAA+NON-PROBE: NOT DETECTED
GFR, ESTIMATED: >90 ML/MIN/1.73M2
GLUCOSE SERPL-MCNC: 78 MG/DL (ref 74–99)
GLUCOSE UR STRIP-MCNC: NEGATIVE MG/DL
HADV DNA NPH QL NAA+NON-PROBE: NOT DETECTED
HCOV 229E RNA NPH QL NAA+NON-PROBE: NOT DETECTED
HCOV HKU1 RNA NPH QL NAA+NON-PROBE: NOT DETECTED
HCOV NL63 RNA NPH QL NAA+NON-PROBE: NOT DETECTED
HCOV OC43 RNA NPH QL NAA+NON-PROBE: NOT DETECTED
HCT VFR BLD AUTO: 43.3 % (ref 37–54)
HGB BLD-MCNC: 14.9 G/DL (ref 12.5–16.5)
HGB UR QL STRIP.AUTO: NEGATIVE
HMPV RNA NPH QL NAA+NON-PROBE: NOT DETECTED
HPIV1 RNA NPH QL NAA+NON-PROBE: NOT DETECTED
HPIV2 RNA NPH QL NAA+NON-PROBE: NOT DETECTED
HPIV3 RNA NPH QL NAA+NON-PROBE: NOT DETECTED
HPIV4 RNA NPH QL NAA+NON-PROBE: NOT DETECTED
IMM GRANULOCYTES # BLD AUTO: <0.03 K/UL (ref 0–0.58)
IMM GRANULOCYTES NFR BLD: 0 % (ref 0–5)
KETONES UR STRIP-MCNC: 15 MG/DL
LACTATE BLDV-SCNC: 3.1 MMOL/L (ref 0.5–2.2)
LEUKOCYTE ESTERASE UR QL STRIP: NEGATIVE
LYMPHOCYTES NFR BLD: 0.98 K/UL (ref 1.5–4)
LYMPHOCYTES RELATIVE PERCENT: 24 % (ref 20–42)
M PNEUMO DNA NPH QL NAA+NON-PROBE: NOT DETECTED
MAGNESIUM SERPL-MCNC: 2.1 MG/DL (ref 1.6–2.6)
MCH RBC QN AUTO: 33.5 PG (ref 26–35)
MCHC RBC AUTO-ENTMCNC: 34.4 G/DL (ref 32–34.5)
MCV RBC AUTO: 97.3 FL (ref 80–99.9)
MONOCYTES NFR BLD: 0.72 K/UL (ref 0.1–0.95)
MONOCYTES NFR BLD: 18 % (ref 2–12)
NEUTROPHILS NFR BLD: 57 % (ref 43–80)
NEUTS SEG NFR BLD: 2.32 K/UL (ref 1.8–7.3)
NITRITE UR QL STRIP: NEGATIVE
PH UR STRIP: 7 [PH] (ref 5–9)
PLATELET # BLD AUTO: 107 K/UL (ref 130–450)
PMV BLD AUTO: 10 FL (ref 7–12)
POTASSIUM SERPL-SCNC: 4 MMOL/L (ref 3.5–5)
PROT SERPL-MCNC: 7.7 G/DL (ref 6.4–8.3)
PROT UR STRIP-MCNC: NEGATIVE MG/DL
RBC # BLD AUTO: 4.45 M/UL (ref 3.8–5.8)
RBC #/AREA URNS HPF: ABNORMAL /HPF
RSV RNA NPH QL NAA+NON-PROBE: NOT DETECTED
RV+EV RNA NPH QL NAA+NON-PROBE: NOT DETECTED
SALICYLATES SERPL-MCNC: <0.3 MG/DL (ref 0–30)
SARS-COV-2 RNA NPH QL NAA+NON-PROBE: NOT DETECTED
SODIUM SERPL-SCNC: 140 MMOL/L (ref 132–146)
SP GR UR STRIP: 1.02 (ref 1–1.03)
SPECIMEN DESCRIPTION: NORMAL
T4 FREE SERPL-MCNC: 0.9 NG/DL (ref 0.9–1.7)
TOXIC TRICYCLIC SC,BLOOD: NEGATIVE
TROPONIN I SERPL HS-MCNC: 11 NG/L (ref 0–11)
TROPONIN I SERPL HS-MCNC: 12 NG/L (ref 0–11)
TSH SERPL DL<=0.05 MIU/L-ACNC: 1.3 UIU/ML (ref 0.27–4.2)
UROBILINOGEN UR STRIP-ACNC: 0.2 EU/DL (ref 0–1)
WBC #/AREA URNS HPF: ABNORMAL /HPF
WBC OTHER # BLD: 4.1 K/UL (ref 4.5–11.5)

## 2024-05-23 PROCEDURE — 84443 ASSAY THYROID STIM HORMONE: CPT

## 2024-05-23 PROCEDURE — 83880 ASSAY OF NATRIURETIC PEPTIDE: CPT

## 2024-05-23 PROCEDURE — 81001 URINALYSIS AUTO W/SCOPE: CPT

## 2024-05-23 PROCEDURE — 80053 COMPREHEN METABOLIC PANEL: CPT

## 2024-05-23 PROCEDURE — 83735 ASSAY OF MAGNESIUM: CPT

## 2024-05-23 PROCEDURE — 84484 ASSAY OF TROPONIN QUANT: CPT

## 2024-05-23 PROCEDURE — 71046 X-RAY EXAM CHEST 2 VIEWS: CPT

## 2024-05-23 PROCEDURE — 6360000004 HC RX CONTRAST MEDICATION: Performed by: RADIOLOGY

## 2024-05-23 PROCEDURE — 83605 ASSAY OF LACTIC ACID: CPT

## 2024-05-23 PROCEDURE — 96374 THER/PROPH/DIAG INJ IV PUSH: CPT

## 2024-05-23 PROCEDURE — 93005 ELECTROCARDIOGRAM TRACING: CPT | Performed by: STUDENT IN AN ORGANIZED HEALTH CARE EDUCATION/TRAINING PROGRAM

## 2024-05-23 PROCEDURE — 85379 FIBRIN DEGRADATION QUANT: CPT

## 2024-05-23 PROCEDURE — 2580000003 HC RX 258: Performed by: STUDENT IN AN ORGANIZED HEALTH CARE EDUCATION/TRAINING PROGRAM

## 2024-05-23 PROCEDURE — 84439 ASSAY OF FREE THYROXINE: CPT

## 2024-05-23 PROCEDURE — 80143 DRUG ASSAY ACETAMINOPHEN: CPT

## 2024-05-23 PROCEDURE — 85025 COMPLETE CBC W/AUTO DIFF WBC: CPT

## 2024-05-23 PROCEDURE — 71275 CT ANGIOGRAPHY CHEST: CPT

## 2024-05-23 PROCEDURE — 80307 DRUG TEST PRSMV CHEM ANLYZR: CPT

## 2024-05-23 PROCEDURE — 0202U NFCT DS 22 TRGT SARS-COV-2: CPT

## 2024-05-23 PROCEDURE — G0480 DRUG TEST DEF 1-7 CLASSES: HCPCS

## 2024-05-23 PROCEDURE — 99285 EMERGENCY DEPT VISIT HI MDM: CPT

## 2024-05-23 PROCEDURE — 80179 DRUG ASSAY SALICYLATE: CPT

## 2024-05-23 RX ORDER — HEPARIN SODIUM 10000 [USP'U]/100ML
5-30 INJECTION, SOLUTION INTRAVENOUS CONTINUOUS
Status: DISCONTINUED | OUTPATIENT
Start: 2024-05-23 | End: 2024-05-25

## 2024-05-23 RX ORDER — HEPARIN SODIUM 1000 [USP'U]/ML
40 INJECTION, SOLUTION INTRAVENOUS; SUBCUTANEOUS PRN
Status: DISCONTINUED | OUTPATIENT
Start: 2024-05-23 | End: 2024-05-25

## 2024-05-23 RX ORDER — HEPARIN SODIUM 1000 [USP'U]/ML
80 INJECTION, SOLUTION INTRAVENOUS; SUBCUTANEOUS PRN
Status: DISCONTINUED | OUTPATIENT
Start: 2024-05-23 | End: 2024-05-25

## 2024-05-23 RX ORDER — 0.9 % SODIUM CHLORIDE 0.9 %
1000 INTRAVENOUS SOLUTION INTRAVENOUS ONCE
Status: COMPLETED | OUTPATIENT
Start: 2024-05-23 | End: 2024-05-23

## 2024-05-23 RX ORDER — HEPARIN SODIUM 1000 [USP'U]/ML
80 INJECTION, SOLUTION INTRAVENOUS; SUBCUTANEOUS ONCE
Status: COMPLETED | OUTPATIENT
Start: 2024-05-23 | End: 2024-05-24

## 2024-05-23 RX ADMIN — SODIUM CHLORIDE 1000 ML: 9 INJECTION, SOLUTION INTRAVENOUS at 19:22

## 2024-05-23 RX ADMIN — IOPAMIDOL 75 ML: 755 INJECTION, SOLUTION INTRAVENOUS at 22:07

## 2024-05-23 ASSESSMENT — LIFESTYLE VARIABLES
HOW OFTEN DO YOU HAVE A DRINK CONTAINING ALCOHOL: 4 OR MORE TIMES A WEEK
HOW MANY STANDARD DRINKS CONTAINING ALCOHOL DO YOU HAVE ON A TYPICAL DAY: 1 OR 2

## 2024-05-24 ENCOUNTER — APPOINTMENT (OUTPATIENT)
Age: 64
End: 2024-05-24
Attending: STUDENT IN AN ORGANIZED HEALTH CARE EDUCATION/TRAINING PROGRAM
Payer: COMMERCIAL

## 2024-05-24 PROBLEM — I26.99 PULMONARY EMBOLISM ON RIGHT (HCC): Status: ACTIVE | Noted: 2024-05-24

## 2024-05-24 PROBLEM — I10 HYPERTENSION: Status: ACTIVE | Noted: 2024-05-24

## 2024-05-24 LAB
ALBUMIN SERPL-MCNC: 4.4 G/DL (ref 3.5–5.2)
ALP SERPL-CCNC: 51 U/L (ref 40–129)
ALT SERPL-CCNC: 66 U/L (ref 0–40)
ANION GAP SERPL CALCULATED.3IONS-SCNC: 20 MMOL/L (ref 7–16)
AST SERPL-CCNC: 105 U/L (ref 0–39)
BILIRUB SERPL-MCNC: 0.9 MG/DL (ref 0–1.2)
BUN SERPL-MCNC: 12 MG/DL (ref 6–23)
CALCIUM SERPL-MCNC: 9 MG/DL (ref 8.6–10.2)
CHLORIDE SERPL-SCNC: 98 MMOL/L (ref 98–107)
CHOLEST SERPL-MCNC: 201 MG/DL
CO2 SERPL-SCNC: 19 MMOL/L (ref 22–29)
CREAT SERPL-MCNC: 0.8 MG/DL (ref 0.7–1.2)
ECHO AO ASC DIAM: 2.9 CM
ECHO AO SINUS VALSALVA DIAM: 3.5 CM
ECHO AV AREA PEAK VELOCITY: 4 CM2
ECHO AV AREA VTI: 5.1 CM2
ECHO AV CUSP MM: 2.1 CM
ECHO AV MEAN GRADIENT: 2 MMHG
ECHO AV MEAN VELOCITY: 0.7 M/S
ECHO AV PEAK GRADIENT: 3 MMHG
ECHO AV PEAK VELOCITY: 0.9 M/S
ECHO AV VELOCITY RATIO: 1.11
ECHO AV VTI: 15.1 CM
ECHO LA DIAMETER: 4.3 CM
ECHO LA VOL A-L A2C: 71 ML (ref 18–58)
ECHO LA VOL A-L A4C: 40 ML (ref 18–58)
ECHO LA VOL MOD A2C: 66 ML (ref 18–58)
ECHO LA VOL MOD A4C: 39 ML (ref 18–58)
ECHO LA VOLUME AREA LENGTH: 54 ML
ECHO LV FRACTIONAL SHORTENING: 42 % (ref 28–44)
ECHO LV INTERNAL DIMENSION DIASTOLIC: 4.3 CM (ref 4.2–5.9)
ECHO LV INTERNAL DIMENSION SYSTOLIC: 2.5 CM
ECHO LV ISOVOLUMETRIC RELAXATION TIME (IVRT): 110.7 MS
ECHO LV IVSD: 1.1 CM (ref 0.6–1)
ECHO LV MASS 2D: 152.6 G (ref 88–224)
ECHO LV POSTERIOR WALL DIASTOLIC: 1 CM (ref 0.6–1)
ECHO LV RELATIVE WALL THICKNESS RATIO: 0.47
ECHO LVOT AREA: 3.8 CM2
ECHO LVOT AV VTI INDEX: 1.4
ECHO LVOT DIAM: 2.2 CM
ECHO LVOT MEAN GRADIENT: 2 MMHG
ECHO LVOT PEAK GRADIENT: 4 MMHG
ECHO LVOT PEAK VELOCITY: 1 M/S
ECHO LVOT SV: 80.2 ML
ECHO LVOT VTI: 21.1 CM
ECHO MV "A" WAVE DURATION: 147.6 MSEC
ECHO MV A VELOCITY: 1.19 M/S
ECHO MV AREA PHT: 6.3 CM2
ECHO MV AREA VTI: 3.5 CM2
ECHO MV E DECELERATION TIME (DT): 84.7 MS
ECHO MV E VELOCITY: 0.55 M/S
ECHO MV E/A RATIO: 0.46
ECHO MV LVOT VTI INDEX: 1.08
ECHO MV MAX VELOCITY: 1.4 M/S
ECHO MV MEAN GRADIENT: 3 MMHG
ECHO MV MEAN VELOCITY: 0.8 M/S
ECHO MV PEAK GRADIENT: 7 MMHG
ECHO MV PRESSURE HALF TIME (PHT): 34.8 MS
ECHO MV VTI: 22.7 CM
ECHO PV MAX VELOCITY: 0.8 M/S
ECHO PV MEAN GRADIENT: 1 MMHG
ECHO PV MEAN VELOCITY: 0.6 M/S
ECHO PV PEAK GRADIENT: 3 MMHG
ECHO PV VTI: 13.2 CM
ECHO RV INTERNAL DIMENSION: 4.4 CM
EKG ATRIAL RATE: 78 BPM
EKG P AXIS: 69 DEGREES
EKG P-R INTERVAL: 186 MS
EKG Q-T INTERVAL: 398 MS
EKG QRS DURATION: 100 MS
EKG QTC CALCULATION (BAZETT): 453 MS
EKG R AXIS: -55 DEGREES
EKG T AXIS: 23 DEGREES
EKG VENTRICULAR RATE: 78 BPM
ERYTHROCYTE [DISTWIDTH] IN BLOOD BY AUTOMATED COUNT: 13.7 % (ref 11.5–15)
ERYTHROCYTE [DISTWIDTH] IN BLOOD BY AUTOMATED COUNT: 13.7 % (ref 11.5–15)
GFR, ESTIMATED: >90 ML/MIN/1.73M2
GLUCOSE SERPL-MCNC: 74 MG/DL (ref 74–99)
HBA1C MFR BLD: 5.2 % (ref 4–5.6)
HCT VFR BLD AUTO: 39.7 % (ref 37–54)
HCT VFR BLD AUTO: 41.5 % (ref 37–54)
HDLC SERPL-MCNC: 90 MG/DL
HGB BLD-MCNC: 13.9 G/DL (ref 12.5–16.5)
HGB BLD-MCNC: 14.2 G/DL (ref 12.5–16.5)
LACTATE BLDV-SCNC: 1.5 MMOL/L (ref 0.5–2.2)
LDLC SERPL CALC-MCNC: 98 MG/DL
MAGNESIUM SERPL-MCNC: 1.9 MG/DL (ref 1.6–2.6)
MCH RBC QN AUTO: 33.5 PG (ref 26–35)
MCH RBC QN AUTO: 34.7 PG (ref 26–35)
MCHC RBC AUTO-ENTMCNC: 34.2 G/DL (ref 32–34.5)
MCHC RBC AUTO-ENTMCNC: 35 G/DL (ref 32–34.5)
MCV RBC AUTO: 97.9 FL (ref 80–99.9)
MCV RBC AUTO: 99 FL (ref 80–99.9)
PARTIAL THROMBOPLASTIN TIME: 110.8 SEC (ref 24.5–35.1)
PARTIAL THROMBOPLASTIN TIME: 120.3 SEC (ref 24.5–35.1)
PARTIAL THROMBOPLASTIN TIME: 199.6 SEC (ref 24.5–35.1)
PARTIAL THROMBOPLASTIN TIME: 32.1 SEC (ref 24.5–35.1)
PHOSPHATE SERPL-MCNC: 3 MG/DL (ref 2.5–4.5)
PLATELET # BLD AUTO: 105 K/UL (ref 130–450)
PLATELET # BLD AUTO: 98 K/UL (ref 130–450)
PLATELET CONFIRMATION: NORMAL
PMV BLD AUTO: 11.1 FL (ref 7–12)
PMV BLD AUTO: 9.9 FL (ref 7–12)
POTASSIUM SERPL-SCNC: 4.3 MMOL/L (ref 3.5–5)
POTASSIUM SERPL-SCNC: 5.2 MMOL/L (ref 3.5–5)
PROT SERPL-MCNC: 7 G/DL (ref 6.4–8.3)
RBC # BLD AUTO: 4.01 M/UL (ref 3.8–5.8)
RBC # BLD AUTO: 4.24 M/UL (ref 3.8–5.8)
SODIUM SERPL-SCNC: 137 MMOL/L (ref 132–146)
TRIGL SERPL-MCNC: 63 MG/DL
VLDLC SERPL CALC-MCNC: 13 MG/DL
WBC OTHER # BLD: 4.9 K/UL (ref 4.5–11.5)
WBC OTHER # BLD: 5 K/UL (ref 4.5–11.5)

## 2024-05-24 PROCEDURE — 80061 LIPID PANEL: CPT

## 2024-05-24 PROCEDURE — 84132 ASSAY OF SERUM POTASSIUM: CPT

## 2024-05-24 PROCEDURE — 2580000003 HC RX 258: Performed by: INTERNAL MEDICINE

## 2024-05-24 PROCEDURE — 6360000002 HC RX W HCPCS: Performed by: STUDENT IN AN ORGANIZED HEALTH CARE EDUCATION/TRAINING PROGRAM

## 2024-05-24 PROCEDURE — 83605 ASSAY OF LACTIC ACID: CPT

## 2024-05-24 PROCEDURE — 83036 HEMOGLOBIN GLYCOSYLATED A1C: CPT

## 2024-05-24 PROCEDURE — 93306 TTE W/DOPPLER COMPLETE: CPT

## 2024-05-24 PROCEDURE — 6360000002 HC RX W HCPCS: Performed by: INTERNAL MEDICINE

## 2024-05-24 PROCEDURE — 83735 ASSAY OF MAGNESIUM: CPT

## 2024-05-24 PROCEDURE — 93010 ELECTROCARDIOGRAM REPORT: CPT | Performed by: INTERNAL MEDICINE

## 2024-05-24 PROCEDURE — 85730 THROMBOPLASTIN TIME PARTIAL: CPT

## 2024-05-24 PROCEDURE — 6370000000 HC RX 637 (ALT 250 FOR IP): Performed by: INTERNAL MEDICINE

## 2024-05-24 PROCEDURE — 80053 COMPREHEN METABOLIC PANEL: CPT

## 2024-05-24 PROCEDURE — 99223 1ST HOSP IP/OBS HIGH 75: CPT | Performed by: INTERNAL MEDICINE

## 2024-05-24 PROCEDURE — 2060000000 HC ICU INTERMEDIATE R&B

## 2024-05-24 PROCEDURE — 93306 TTE W/DOPPLER COMPLETE: CPT | Performed by: INTERNAL MEDICINE

## 2024-05-24 PROCEDURE — 85027 COMPLETE CBC AUTOMATED: CPT

## 2024-05-24 PROCEDURE — 84100 ASSAY OF PHOSPHORUS: CPT

## 2024-05-24 PROCEDURE — 36415 COLL VENOUS BLD VENIPUNCTURE: CPT

## 2024-05-24 RX ORDER — HYDRALAZINE HYDROCHLORIDE 20 MG/ML
10 INJECTION INTRAMUSCULAR; INTRAVENOUS EVERY 6 HOURS PRN
Status: DISCONTINUED | OUTPATIENT
Start: 2024-05-24 | End: 2024-05-30 | Stop reason: HOSPADM

## 2024-05-24 RX ORDER — SODIUM CHLORIDE 0.9 % (FLUSH) 0.9 %
5-40 SYRINGE (ML) INJECTION EVERY 12 HOURS SCHEDULED
Status: DISCONTINUED | OUTPATIENT
Start: 2024-05-24 | End: 2024-05-30 | Stop reason: HOSPADM

## 2024-05-24 RX ORDER — LORAZEPAM 1 MG/1
3 TABLET ORAL
Status: DISCONTINUED | OUTPATIENT
Start: 2024-05-24 | End: 2024-05-30 | Stop reason: HOSPADM

## 2024-05-24 RX ORDER — POTASSIUM CHLORIDE 20 MEQ/1
40 TABLET, EXTENDED RELEASE ORAL PRN
Status: DISCONTINUED | OUTPATIENT
Start: 2024-05-24 | End: 2024-05-30 | Stop reason: HOSPADM

## 2024-05-24 RX ORDER — ACETAMINOPHEN 650 MG/1
650 SUPPOSITORY RECTAL EVERY 6 HOURS PRN
Status: DISCONTINUED | OUTPATIENT
Start: 2024-05-24 | End: 2024-05-30 | Stop reason: HOSPADM

## 2024-05-24 RX ORDER — LORAZEPAM 1 MG/1
4 TABLET ORAL
Status: DISCONTINUED | OUTPATIENT
Start: 2024-05-24 | End: 2024-05-30 | Stop reason: HOSPADM

## 2024-05-24 RX ORDER — SODIUM CHLORIDE 9 MG/ML
INJECTION, SOLUTION INTRAVENOUS PRN
Status: DISCONTINUED | OUTPATIENT
Start: 2024-05-24 | End: 2024-05-30 | Stop reason: HOSPADM

## 2024-05-24 RX ORDER — MAGNESIUM SULFATE IN WATER 40 MG/ML
2000 INJECTION, SOLUTION INTRAVENOUS PRN
Status: DISCONTINUED | OUTPATIENT
Start: 2024-05-24 | End: 2024-05-30 | Stop reason: HOSPADM

## 2024-05-24 RX ORDER — ACETAMINOPHEN 325 MG/1
650 TABLET ORAL EVERY 6 HOURS PRN
Status: DISCONTINUED | OUTPATIENT
Start: 2024-05-24 | End: 2024-05-30 | Stop reason: HOSPADM

## 2024-05-24 RX ORDER — LORAZEPAM 2 MG/ML
1 INJECTION INTRAMUSCULAR
Status: DISCONTINUED | OUTPATIENT
Start: 2024-05-24 | End: 2024-05-30 | Stop reason: HOSPADM

## 2024-05-24 RX ORDER — LORAZEPAM 2 MG/ML
4 INJECTION INTRAMUSCULAR
Status: DISCONTINUED | OUTPATIENT
Start: 2024-05-24 | End: 2024-05-30 | Stop reason: HOSPADM

## 2024-05-24 RX ORDER — LORAZEPAM 1 MG/1
2 TABLET ORAL
Status: DISCONTINUED | OUTPATIENT
Start: 2024-05-24 | End: 2024-05-30 | Stop reason: HOSPADM

## 2024-05-24 RX ORDER — SODIUM CHLORIDE 0.9 % (FLUSH) 0.9 %
5-40 SYRINGE (ML) INJECTION PRN
Status: DISCONTINUED | OUTPATIENT
Start: 2024-05-24 | End: 2024-05-30 | Stop reason: HOSPADM

## 2024-05-24 RX ORDER — LORAZEPAM 2 MG/ML
3 INJECTION INTRAMUSCULAR
Status: DISCONTINUED | OUTPATIENT
Start: 2024-05-24 | End: 2024-05-30 | Stop reason: HOSPADM

## 2024-05-24 RX ORDER — ONDANSETRON 2 MG/ML
4 INJECTION INTRAMUSCULAR; INTRAVENOUS EVERY 6 HOURS PRN
Status: DISCONTINUED | OUTPATIENT
Start: 2024-05-24 | End: 2024-05-30 | Stop reason: HOSPADM

## 2024-05-24 RX ORDER — LANOLIN ALCOHOL/MO/W.PET/CERES
100 CREAM (GRAM) TOPICAL DAILY
Status: DISCONTINUED | OUTPATIENT
Start: 2024-05-24 | End: 2024-05-30 | Stop reason: HOSPADM

## 2024-05-24 RX ORDER — LORAZEPAM 2 MG/ML
2 INJECTION INTRAMUSCULAR
Status: DISCONTINUED | OUTPATIENT
Start: 2024-05-24 | End: 2024-05-30 | Stop reason: HOSPADM

## 2024-05-24 RX ORDER — ONDANSETRON 4 MG/1
4 TABLET, ORALLY DISINTEGRATING ORAL EVERY 8 HOURS PRN
Status: DISCONTINUED | OUTPATIENT
Start: 2024-05-24 | End: 2024-05-30 | Stop reason: HOSPADM

## 2024-05-24 RX ORDER — BENZONATATE 100 MG/1
100 CAPSULE ORAL 3 TIMES DAILY PRN
Status: DISCONTINUED | OUTPATIENT
Start: 2024-05-24 | End: 2024-05-30 | Stop reason: HOSPADM

## 2024-05-24 RX ORDER — FOLIC ACID 1 MG/1
1 TABLET ORAL DAILY
Status: DISCONTINUED | OUTPATIENT
Start: 2024-05-24 | End: 2024-05-30 | Stop reason: HOSPADM

## 2024-05-24 RX ORDER — LISINOPRIL 5 MG/1
5 TABLET ORAL EVERY 12 HOURS SCHEDULED
Status: DISCONTINUED | OUTPATIENT
Start: 2024-05-24 | End: 2024-05-30 | Stop reason: HOSPADM

## 2024-05-24 RX ORDER — LANOLIN ALCOHOL/MO/W.PET/CERES
3 CREAM (GRAM) TOPICAL NIGHTLY PRN
Status: DISCONTINUED | OUTPATIENT
Start: 2024-05-24 | End: 2024-05-30 | Stop reason: HOSPADM

## 2024-05-24 RX ORDER — LORAZEPAM 1 MG/1
1 TABLET ORAL
Status: DISCONTINUED | OUTPATIENT
Start: 2024-05-24 | End: 2024-05-30 | Stop reason: HOSPADM

## 2024-05-24 RX ORDER — CALCIUM CARBONATE 500 MG/1
500 TABLET, CHEWABLE ORAL 3 TIMES DAILY PRN
Status: DISCONTINUED | OUTPATIENT
Start: 2024-05-24 | End: 2024-05-30 | Stop reason: HOSPADM

## 2024-05-24 RX ORDER — POLYETHYLENE GLYCOL 3350 17 G/17G
17 POWDER, FOR SOLUTION ORAL DAILY PRN
Status: DISCONTINUED | OUTPATIENT
Start: 2024-05-24 | End: 2024-05-30 | Stop reason: HOSPADM

## 2024-05-24 RX ORDER — POTASSIUM CHLORIDE 7.45 MG/ML
10 INJECTION INTRAVENOUS PRN
Status: DISCONTINUED | OUTPATIENT
Start: 2024-05-24 | End: 2024-05-30 | Stop reason: HOSPADM

## 2024-05-24 RX ADMIN — HEPARIN SODIUM 18 UNITS/KG/HR: 10000 INJECTION, SOLUTION INTRAVENOUS at 20:57

## 2024-05-24 RX ADMIN — LORAZEPAM 2 MG: 2 INJECTION INTRAMUSCULAR; INTRAVENOUS at 03:08

## 2024-05-24 RX ADMIN — LORAZEPAM 1 MG: 2 INJECTION INTRAMUSCULAR; INTRAVENOUS at 21:21

## 2024-05-24 RX ADMIN — LORAZEPAM 1 MG: 2 INJECTION INTRAMUSCULAR; INTRAVENOUS at 04:47

## 2024-05-24 RX ADMIN — SODIUM CHLORIDE, PRESERVATIVE FREE 10 ML: 5 INJECTION INTRAVENOUS at 09:10

## 2024-05-24 RX ADMIN — HEPARIN SODIUM 5900 UNITS: 1000 INJECTION INTRAVENOUS; SUBCUTANEOUS at 01:16

## 2024-05-24 RX ADMIN — HEPARIN SODIUM 20 UNITS/KG/HR: 10000 INJECTION, SOLUTION INTRAVENOUS at 06:58

## 2024-05-24 RX ADMIN — LISINOPRIL 5 MG: 10 TABLET ORAL at 09:11

## 2024-05-24 RX ADMIN — HEPARIN SODIUM 18 UNITS/KG/HR: 10000 INJECTION, SOLUTION INTRAVENOUS at 01:19

## 2024-05-24 RX ADMIN — SODIUM CHLORIDE, PRESERVATIVE FREE 10 ML: 5 INJECTION INTRAVENOUS at 21:21

## 2024-05-24 RX ADMIN — LISINOPRIL 5 MG: 10 TABLET ORAL at 21:21

## 2024-05-24 RX ADMIN — FOLIC ACID 1 MG: 1 TABLET ORAL at 09:11

## 2024-05-24 RX ADMIN — Medication 100 MG: at 09:11

## 2024-05-24 RX ADMIN — LORAZEPAM 1 MG: 1 TABLET ORAL at 18:32

## 2024-05-24 RX ADMIN — HEPARIN SODIUM 3000 UNITS: 1000 INJECTION, SOLUTION INTRAVENOUS; SUBCUTANEOUS at 07:04

## 2024-05-24 RX ADMIN — LORAZEPAM 1 MG: 1 TABLET ORAL at 15:47

## 2024-05-24 ASSESSMENT — PAIN SCALES - GENERAL
PAINLEVEL_OUTOF10: 0
PAINLEVEL_OUTOF10: 0

## 2024-05-24 NOTE — H&P
Inpatient H&P      PCP:  Avni Leung DO  Admitting Physician:  Little Duenas DO  Consultants:  None at this time  Chief Complaint:    Chief Complaint   Patient presents with    Dizziness     Patient states he has been feeling lightheaded,shaky, diaphoretic and weak the last couple weeks. Has echo scheduled for 8/12       History of Present Illness  Juan Francisco Jaquez is a 63 y.o. male who presents to [facility] ER complaining of dizziness.    Juan Francisco Jaquez has a past medical history that includes coronary artery disease,  , alcohol abuse, hypertension, mitral regurg    Juan Francisco presented to the ER with complaint of lightheadedness, diaphoresis, shakiness, weakness starting a few days ago. He said he was at work and was feeling like he was going to pass out.   He had CT pulmonary ordered which showed partially occlusive thrombus involving pulmonary artery branches to the posterior right lower lobe.  He was started on heparin drip.  He does drink 1.5 bottles of alcohol per day. Started on CIWA  Discussed patient's case with ED physician.    ER Course  Upon presentation to the ER, routine labwork was performed which revealed lactic acid 3.1, ethanol 88, WBC 4.1, D-dimer 1065.  Imaging results are as outlined below in the Imaging section of this note.   Upon arrival to the ER, patient was 109/79.  The patient received heparin in the emergency room and was admitted to [facility].    Last Hospital Admission -I personally reviewed admission from 7/1/2022  Nonrheumatic mitral valve regurg  Mitral valve prolapse  History of alcohol abuse  Chest discomfort  Shortness of breath    Last Echocardiogram -I personally reviewed echocardiogram results from 4/12/2022   Ejection fraction is visually estimated at 60 to 65%.   Normal right ventricular size and function.   Severe Myxomatotic degeneration of mitral valve.   Severe mitral regurgitation   Systolic flow reversal pulmonary venous flow is seen consistent with    Severe mitral regurgitation.   The mitral regurgitation is eccentric .   Mitral regurgitant volume by PISA is 61 ml with ERO of 0.4cm2 consistent   with severe mitral regurgitation.   Moderate flail of of mitral valve.   Mild aortic regurgitation is noted.   Mild tricuspid regurgitation.     ED TRIAGE VITALS  BP: (!) 152/95, Temp: 98.1 °F (36.7 °C), Pulse: 88, Respirations: 16, SpO2: 97 %    Vitals:    05/24/24 0015 05/24/24 0045 05/24/24 0115 05/24/24 0123   BP:    (!) 152/95   Pulse: 85 81 90 88   Resp: 19 21 24 16   Temp:       TempSrc:       SpO2: 95% 96% 96% 97%   Weight:             Histories  Past Medical History:   Diagnosis Date    Chronic venous insufficiency 02/26/2015    History of DVT (deep vein thrombosis) 02/26/2015    Hx of blood clots 01/01/2009    LEFT LEG    Inguinal hernia     Screening for colon cancer 2024    Varicose veins of lower extremities 02/26/2015     Past Surgical History:   Procedure Laterality Date    CARDIAC CATHETERIZATION  04/13/2022    Dr hernandez    COLONOSCOPY      COLONOSCOPY N/A 1/9/2024    COLORECTAL CANCER SCREENING, NOT HIGH RISK performed by Umer Rudolph MD at [facility] ENDOSCOPY    HERNIA REPAIR Bilateral 12/28/2013    Inguinal    MITRAL VALVE REPAIR  07/01/2022    robotic mini mitral valvoplasty [facility]    TRANSESOPHAGEAL ECHOCARDIOGRAM  04/12/2022     History reviewed. No pertinent family history.    Home Medications  Prior to Admission medications    Medication Sig Start Date End Date Taking? Authorizing Provider   aspirin 325 MG tablet Take 1 tablet by mouth daily 1/2 tablet once daily    Vito Corona MD   Ferrous Sulfate (IRON) 325 (65 Fe) MG TABS Take 65 mg by mouth daily    Vito Corona MD   lisinopril (PRINIVIL;ZESTRIL) 5 MG tablet Take 1 tablet by mouth every 12 hours 12/6/23   Cassidy Hernandez MD   metoprolol tartrate (LOPRESSOR) 25 MG tablet Take 25 mg by mouth in the morning and 25 mg before bedtime.  Patient not taking: Reported on 8/1/2023    Provider,

## 2024-05-24 NOTE — ED NOTES
Pt admits to drinking 1.5 - 2 pints of [employer]vet liquor daily, last drink yesterday am 11*hrs ago

## 2024-05-24 NOTE — PROGRESS NOTES
Patient seen and examined in ED.  No new complains, not in acute distress.   AO X 3, lungs  B/L Clear on auscultation, regular rate and thythm.  Continue Heparin drip for Pulmonary embolism  Remains on CIWA protocol for alcohol withdrawal.    Please refer to my colleague's note from this morning for full H and P.

## 2024-05-24 NOTE — CONSULTS
[facility]  [address], [address]  [place] [address]    Pulmonary Consultation      Reason for Consult:  weakness    Requesting Physician:  Avni Leung DO    CHIEF COMPLAINT:  weakness    History Obtained From:  patient    HISTORY OF PRESENT ILLNESS:                The patient is a 63 y.o. male with significant past medical history of MVR who presents with weakness and near-syncope. Mentions a few episodes on feeling weak and near-syncope Thursday last week while at work, didn't occur until yesterday when started happening again. Work apparently told a family member who convinced pt to go to hospital today. Claims didn't have any problems with breathing over the past week, had slight increase in cough/nasal congestion the past few weeks which he would take an OTC antihistamine. Denies being on any lung medications or oxygen, has hx of eliquis intake for DVT but not clear when this stopped, doesn't remember being on this. Had robotic MVR at [facility] in 2022 and appears to be doing ok with that. Currently lying down in stretcher on 2 li NC saturating 96-97%, claims no current issues with sob, minimal cough with not much sputum production.    Past Medical History:        Diagnosis Date    Chronic venous insufficiency 02/26/2015    History of DVT (deep vein thrombosis) 02/26/2015    Hx of blood clots 01/01/2009    LEFT LEG    Inguinal hernia     Screening for colon cancer 2024    Varicose veins of lower extremities 02/26/2015       Past Surgical History:        Procedure Laterality Date    CARDIAC CATHETERIZATION  04/13/2022    Dr hernandez    COLONOSCOPY      COLONOSCOPY N/A 1/9/2024    COLORECTAL CANCER SCREENING, NOT HIGH RISK performed by Umer Rudolph MD at [facility] ENDOSCOPY    HERNIA REPAIR Bilateral 12/28/2013    Inguinal    MITRAL VALVE REPAIR  07/01/2022    robotic mini mitral valvoplasty [facility]    TRANSESOPHAGEAL ECHOCARDIOGRAM  04/12/2022

## 2024-05-24 NOTE — ED PROVIDER NOTES
[facility] EMERGENCY DEPARTMENT  EMERGENCY DEPARTMENT ENCOUNTER        Pt Name: Juan Francisco Jaquez  MRN: [id number]  Birthdate 1960  Date of evaluation: 5/23/2024  Provider: Kathy Felton DO  PCP: Avni Leung DO  Note Started: 11:43 PM EDT 5/23/24    CHIEF COMPLAINT       Chief Complaint   Patient presents with    Dizziness     Patient states he has been feeling lightheaded,shaky, diaphoretic and weak the last couple weeks. Has echo scheduled for 8/12       HISTORY OF PRESENT ILLNESS: 1 or more Elements     Limitations to history : None    Juan Francisco Jaquez is a 63 y.o. male with past medical history of CAD, HTN, mitral valve regurg, and alcohol abuse.  He presents the ED for evaluation of around 2 or 3 weeks of feeling lightheaded, diaphoretic and shaky at times, especially when he exerts himself.  He says it has been getting worse over the past couple of days.  Sometimes he will feel short of breath as though he might pass out; he says that the symptoms do improve if he sits down and rests, but they keep happening anytime he tries to get up and do anything.  He will also get a heart racing sensation.  He has not had any chest pain.  He denies any fevers, cough or sore throat, abdominal pain, nausea vomiting or diarrhea.  Of note, he does state that he drinks about one half bottles of alcohol per day, his last drink was earlier in the day today.  He denies any fevers or chills, cough or sore throat, lower extremity edema or tenderness, numbness or weakness anywhere.        Nursing Notes were all reviewed and agreed with or any disagreements were addressed in the HPI.      REVIEW OF EXTERNAL NOTE :       Reviewed documentation from trauma services on 1/15/2024.    Chart Review/External Note Review    Last Echo reviewed by Me:  Lab Results   Component Value Date    LVEF 63 04/12/2022           Controlled Substance Monitoring:    Acute and Chronic Pain Monitoring:

## 2024-05-24 NOTE — PROGRESS NOTES
4 Eyes Skin Assessment     NAME:  Juan Francisco Jaquez  YOB: 1960  MEDICAL RECORD NUMBER:  [id number]    The patient is being assessed for  Admission    I agree that at least one RN has performed a thorough Head to Toe Skin Assessment on the patient. ALL assessment sites listed below have been assessed.      Areas assessed by both nurses:    Head, Face, Ears, Shoulders, Back, Chest, Arms, Elbows, Hands, and Sacrum. Buttock, Coccyx, Ischium        Does the Patient have a Wound? No noted wound(s)       Vicente Prevention initiated by RN: Yes  Wound Care Orders initiated by RN: Yes    Pressure Injury (Stage 3,4, Unstageable, DTI, NWPT, and Complex wounds) if present, place Wound referral order by RN under : No    New Ostomies, if present place, Ostomy referral order under : No     Nurse 1 eSignature: Electronically signed by Cezar Forbes RN on 5/24/24 at 6:13 PM EDT    **SHARE this note so that the co-signing nurse can place an eSignature**    Nurse 2 eSignature: {Esignature:[id number]}

## 2024-05-24 NOTE — CARE COORDINATION
Social Work /Transition of Care:    Pt presents to the ED secondary to dizziness from home.  Pt's ETOH is 88 upon arrival.  Pt is admitted with pulmonary embolism.  Pt has a pulmonology consult.  Pt is currently on 2L oxygen and does not have home oxygen.    SW met with pt who was alert and oriented x3, sitting up in bed.  Pt reports living alone in a 3 story home with 2 steps at the entrance.  Pt reports he has no DME at home and no recent falls.  Pt's PCP is Dr Leung and he uses [facility] on [address] to fill prescriptions.  Pt plan is to return home upon discharge.  Pt reports drinking a pint and a half of whiskey daily.  Pt reports no hx going to detox and/or addiction treatment.  Pt reports hx of one DUI.  Pt declined any referrals to treatment or to speak with peer recovery.  Pt reports no needs for discharge.  SW/CM to follow.

## 2024-05-25 ENCOUNTER — APPOINTMENT (OUTPATIENT)
Dept: ULTRASOUND IMAGING | Age: 64
End: 2024-05-25
Payer: COMMERCIAL

## 2024-05-25 LAB
ALBUMIN SERPL-MCNC: 4.1 G/DL (ref 3.5–5.2)
ALP SERPL-CCNC: 52 U/L (ref 40–129)
ALT SERPL-CCNC: 54 U/L (ref 0–40)
ANION GAP SERPL CALCULATED.3IONS-SCNC: 16 MMOL/L (ref 7–16)
AST SERPL-CCNC: 76 U/L (ref 0–39)
BASOPHILS # BLD: 0.04 K/UL (ref 0–0.2)
BASOPHILS NFR BLD: 1 % (ref 0–2)
BILIRUB SERPL-MCNC: 1 MG/DL (ref 0–1.2)
BUN SERPL-MCNC: 12 MG/DL (ref 6–23)
CALCIUM SERPL-MCNC: 8.7 MG/DL (ref 8.6–10.2)
CHLORIDE SERPL-SCNC: 98 MMOL/L (ref 98–107)
CO2 SERPL-SCNC: 20 MMOL/L (ref 22–29)
CREAT SERPL-MCNC: 0.8 MG/DL (ref 0.7–1.2)
EOSINOPHIL # BLD: 0.07 K/UL (ref 0.05–0.5)
EOSINOPHILS RELATIVE PERCENT: 2 % (ref 0–6)
ERYTHROCYTE [DISTWIDTH] IN BLOOD BY AUTOMATED COUNT: 13.2 % (ref 11.5–15)
GFR, ESTIMATED: >90 ML/MIN/1.73M2
GLUCOSE SERPL-MCNC: 99 MG/DL (ref 74–99)
HCT VFR BLD AUTO: 39.2 % (ref 37–54)
HGB BLD-MCNC: 13.5 G/DL (ref 12.5–16.5)
IMM GRANULOCYTES # BLD AUTO: <0.03 K/UL (ref 0–0.58)
IMM GRANULOCYTES NFR BLD: 0 % (ref 0–5)
LYMPHOCYTES NFR BLD: 0.87 K/UL (ref 1.5–4)
LYMPHOCYTES RELATIVE PERCENT: 19 % (ref 20–42)
MCH RBC QN AUTO: 34.5 PG (ref 26–35)
MCHC RBC AUTO-ENTMCNC: 34.4 G/DL (ref 32–34.5)
MCV RBC AUTO: 100.3 FL (ref 80–99.9)
MONOCYTES NFR BLD: 0.74 K/UL (ref 0.1–0.95)
MONOCYTES NFR BLD: 16 % (ref 2–12)
NEUTROPHILS NFR BLD: 62 % (ref 43–80)
NEUTS SEG NFR BLD: 2.88 K/UL (ref 1.8–7.3)
PARTIAL THROMBOPLASTIN TIME: 58.2 SEC (ref 24.5–35.1)
PARTIAL THROMBOPLASTIN TIME: 67.2 SEC (ref 24.5–35.1)
PLATELET # BLD AUTO: 101 K/UL (ref 130–450)
PMV BLD AUTO: 11.4 FL (ref 7–12)
POTASSIUM SERPL-SCNC: 3.6 MMOL/L (ref 3.5–5)
PROT SERPL-MCNC: 6.3 G/DL (ref 6.4–8.3)
RBC # BLD AUTO: 3.91 M/UL (ref 3.8–5.8)
SODIUM SERPL-SCNC: 134 MMOL/L (ref 132–146)
WBC OTHER # BLD: 4.6 K/UL (ref 4.5–11.5)

## 2024-05-25 PROCEDURE — 2580000003 HC RX 258: Performed by: INTERNAL MEDICINE

## 2024-05-25 PROCEDURE — 85730 THROMBOPLASTIN TIME PARTIAL: CPT

## 2024-05-25 PROCEDURE — 93970 EXTREMITY STUDY: CPT

## 2024-05-25 PROCEDURE — 85025 COMPLETE CBC W/AUTO DIFF WBC: CPT

## 2024-05-25 PROCEDURE — 99232 SBSQ HOSP IP/OBS MODERATE 35: CPT | Performed by: STUDENT IN AN ORGANIZED HEALTH CARE EDUCATION/TRAINING PROGRAM

## 2024-05-25 PROCEDURE — 6370000000 HC RX 637 (ALT 250 FOR IP): Performed by: STUDENT IN AN ORGANIZED HEALTH CARE EDUCATION/TRAINING PROGRAM

## 2024-05-25 PROCEDURE — 80053 COMPREHEN METABOLIC PANEL: CPT

## 2024-05-25 PROCEDURE — 2060000000 HC ICU INTERMEDIATE R&B

## 2024-05-25 PROCEDURE — 6370000000 HC RX 637 (ALT 250 FOR IP): Performed by: INTERNAL MEDICINE

## 2024-05-25 PROCEDURE — 36415 COLL VENOUS BLD VENIPUNCTURE: CPT

## 2024-05-25 RX ADMIN — LORAZEPAM 3 MG: 1 TABLET ORAL at 18:54

## 2024-05-25 RX ADMIN — FOLIC ACID 1 MG: 1 TABLET ORAL at 07:57

## 2024-05-25 RX ADMIN — SODIUM CHLORIDE, PRESERVATIVE FREE 10 ML: 5 INJECTION INTRAVENOUS at 20:37

## 2024-05-25 RX ADMIN — LORAZEPAM 2 MG: 1 TABLET ORAL at 07:57

## 2024-05-25 RX ADMIN — LISINOPRIL 5 MG: 10 TABLET ORAL at 20:31

## 2024-05-25 RX ADMIN — Medication 100 MG: at 07:57

## 2024-05-25 RX ADMIN — LORAZEPAM 1 MG: 1 TABLET ORAL at 05:05

## 2024-05-25 RX ADMIN — APIXABAN 10 MG: 5 TABLET, FILM COATED ORAL at 20:37

## 2024-05-25 RX ADMIN — LISINOPRIL 5 MG: 10 TABLET ORAL at 07:57

## 2024-05-25 RX ADMIN — APIXABAN 10 MG: 5 TABLET, FILM COATED ORAL at 10:05

## 2024-05-25 RX ADMIN — SODIUM CHLORIDE, PRESERVATIVE FREE 10 ML: 5 INJECTION INTRAVENOUS at 20:31

## 2024-05-25 NOTE — PLAN OF CARE
Problem: Pain  Goal: Verbalizes/displays adequate comfort level or baseline comfort level  5/25/2024 1119 by Kerry Weiss, RN  Flowsheets (Taken 5/25/2024 1119)  Verbalizes/displays adequate comfort level or baseline comfort level:   Encourage patient to monitor pain and request assistance   Assess pain using appropriate pain scale  5/25/2024 0327 by Neeraj Lantigua RN  Outcome: Progressing     Problem: Safety - Adult  Goal: Free from fall injury  5/25/2024 0327 by Neeraj Lantigua RN  Outcome: Progressing     Problem: Safety - Adult  Goal: Free from fall injury  5/25/2024 1119 by Kerry Weiss, RN  Flowsheets (Taken 5/25/2024 1119)  Free From Fall Injury: Based on caregiver fall risk screen, instruct family/caregiver to ask for assistance with transferring infant if caregiver noted to have fall risk factors  5/25/2024 0327 by Neeraj Lantigua, RN  Outcome: Progressing

## 2024-05-25 NOTE — PROGRESS NOTES
[facility]  [address], [address]  [place] [address]      Pulmonary Progress Note      SUBJECTIVE:  claims doing ok with breathing, no cough/congestion, on RA lying down in bed, looking slightly tremulous but claims he's ok.    OBJECTIVE    Medications    Continuous Infusions:   sodium chloride      sodium chloride         Scheduled Meds:   apixaban  10 mg Oral BID    Followed by    [START ON 2024] apixaban  5 mg Oral BID    sodium chloride flush  5-40 mL IntraVENous 2 times per day    lisinopril  5 mg Oral 2 times per day    thiamine  100 mg Oral Daily    folic acid  1 mg Oral Daily    sodium chloride flush  5-40 mL IntraVENous 2 times per day       PRN Meds:benzocaine-menthol, benzonatate, calcium carbonate, hydrALAZINE, melatonin, Polyvinyl Alcohol-Povidone PF, sodium chloride, sodium chloride flush, sodium chloride, potassium chloride **OR** potassium alternative oral replacement **OR** potassium chloride, magnesium sulfate, ondansetron **OR** ondansetron, polyethylene glycol, acetaminophen **OR** acetaminophen, sodium chloride flush, sodium chloride, LORazepam **OR** LORazepam **OR** LORazepam **OR** LORazepam **OR** LORazepam **OR** LORazepam **OR** LORazepam **OR** LORazepam    Physical    VITALS:  /86   Pulse 82   Temp 98.1 °F (36.7 °C) (Temporal)   Resp 16   Ht 1.753 m (5' 9\")   Wt 73.9 kg (163 lb)   SpO2 95%   BMI 24.07 kg/m²     24HR INTAKE/OUTPUT:      Intake/Output Summary (Last 24 hours) at 2024 1634  Last data filed at 2024 1200  Gross per 24 hour   Intake 440 ml   Output 1150 ml   Net -710 ml       24HR PULSE OXIMETRY RANGE:    SpO2  Av.4 %  Min: 94 %  Max: 97 %    General appearance: alert, appears stated age, and cooperative  Lungs: clear to auscultation bilaterally  Heart: regular rate and rhythm, S1, S2 normal, no murmur, click, rub or gallop  Abdomen: soft, non-tender; bowel sounds normal;  no masses,  no organomegaly  Extremities: extremities normal, atraumatic, no cyanosis or edema  Neurologic: Mental status: Alert, oriented, thought content appropriate    Data    CBC:   Recent Labs     05/24/24  0056 05/24/24  0547 05/25/24  0658   WBC 4.9 5.0 4.6   HGB 14.2 13.9 13.5   HCT 41.5 39.7 39.2   MCV 97.9 99.0 100.3*   PLT 98* 105* 101*       BMP:  Recent Labs     05/23/24  1925 05/24/24  0547 05/24/24  0845 05/25/24  0658    137  --  134   K 4.0 5.2* 4.3 3.6   CL 97* 98  --  98   CO2 23 19*  --  20*   PHOS  --  3.0  --   --    BUN 11 12  --  12   CREATININE 0.9 0.8  --  0.8    ALB:3,BILIDIR:3,BILITOT:3,ALKPHOS:3)@    PT/INR: No results for input(s): \"PROTIME\", \"INR\" in the last 72 hours.    ABG:   No results for input(s): \"PH\", \"PO2\", \"PCO2\", \"HCO3\", \"BE\", \"O2SAT\", \"METHB\", \"O2HB\", \"COHB\", \"O2CON\", \"HHB\", \"THB\" in the last 72 hours.          Radiology/Other tests reviewed: LE dopplers with (+) DVT    Assessment:     Principal Problem:    Pulmonary embolism on right (HCC)  Active Problems:    Alcohol abuse    Hypertension  Resolved Problems:    * No resolved hospital problems. *      Plan:       Can start on DOAC if no further procedures planned, may need to consider lifelong therapy given previous hx of DVT unless there is an inciting factor  On RA and no lung medications, observe respiratory function and saturations  Watch for possible ETOH withdrawals, as per PCP  Will need to watch anticoagulation use and current history of ETOH intake and increased bleeding risk      Time at the bedside, reviewing labs and radiographs, reviewing notes and consultations, discussing with staff and family was more than 50 minutes.      Thanks for letting us see this patient in consultation.  Please contact us with any questions. Office [phone] or after hours through [employer], x [phone].

## 2024-05-25 NOTE — PLAN OF CARE
Problem: Discharge Planning  Goal: Discharge to home or other facility with appropriate resources  Outcome: Progressing  Flowsheets (Taken 5/25/2024 0800 by Kerry Weiss, RN)  Discharge to home or other facility with appropriate resources: Identify barriers to discharge with patient and caregiver     Problem: Pain  Goal: Verbalizes/displays adequate comfort level or baseline comfort level  5/25/2024 1848 by Gladys Delgadillo RN  Outcome: Progressing  5/25/2024 1119 by Kerry Weiss, RN  Flowsheets (Taken 5/25/2024 1119)  Verbalizes/displays adequate comfort level or baseline comfort level:   Encourage patient to monitor pain and request assistance   Assess pain using appropriate pain scale     Problem: Safety - Adult  Goal: Free from fall injury  5/25/2024 1848 by Gladys Delgadillo RN  Outcome: Progressing  5/25/2024 1119 by Kerry Weiss, RN  Flowsheets (Taken 5/25/2024 1119)  Free From Fall Injury: Based on caregiver fall risk screen, instruct family/caregiver to ask for assistance with transferring infant if caregiver noted to have fall risk factors

## 2024-05-25 NOTE — PROGRESS NOTES
Hospitalist Progress Note      Chief Complaint:  had concerns including Dizziness (Patient states he has been feeling lightheaded,shaky, diaphoretic and weak the last couple weeks. Has echo scheduled for ).    Admission Date: 2024     SYNOPSIS: Juan Francisco Jaquez has a past medical history that includes coronary artery disease,  , alcohol abuse, hypertension, mitral regurg   Juan Francisco presented to the ER with complaint of lightheadedness, diaphoresis, shakiness, weakness starting a few days ago. He said he was at work and was feeling like he was going to pass out.   CTA suggestive of Right PE was started on heparin Drip , USG LE showing B/L DVT  On CIWA score for alcohol withdrawal     SUBJECTIVE:    Patient Seen and examined at bedside, He has shaking and tremors 2/2 Alcohol withdrawal, AO  X 3   Records reviewed. Stable overnight. No overnight issues reported     Temp (24hrs), Av.4 °F (36.3 °C), Min:96.9 °F (36.1 °C), Max:98.3 °F (36.8 °C)    DIET: ADULT DIET; Regular  CODE: Full Code    Intake/Output Summary (Last 24 hours) at 2024 1414  Last data filed at 2024 0831  Gross per 24 hour   Intake 200 ml   Output 1150 ml   Net -950 ml       OBJECTIVE:    /73   Pulse 94   Temp 97.3 °F (36.3 °C) (Temporal)   Resp 16   Ht 1.753 m (5' 9\")   Wt 73.9 kg (163 lb)   SpO2 95%   BMI 24.07 kg/m²     General appearance: No apparent distress, appears stated age and cooperative.   HEENT:  Normocephalic. Conjunctivae/corneas clear. Moist mucosa   Neck: Supple. No jugular venous distention. Thyroid not visible, non tender   Respiratory: Normal respiratory effort. Clear to auscultation bilaterally. No stridor/wheezing/rhonchi/crackles   Cardiovascular: Regular heart beats, normal S1-S2. No M/G/R  Abdomen: Non distended, soft, non tender, no visceromegaly, no mass, normal bowel sounds   Musculoskeletal: No clubbing, cyanosis, no bilateral lower extremity edema. Brisk capillary refill.   Skin:  No  LORazepam    Labs:     Recent Labs     05/24/24  0056 05/24/24  0547 05/25/24  0658   WBC 4.9 5.0 4.6   HGB 14.2 13.9 13.5   HCT 41.5 39.7 39.2   PLT 98* 105* 101*       Recent Labs     05/23/24 1925 05/24/24  0547 05/24/24  0845 05/25/24  0658    137  --  134   K 4.0 5.2* 4.3 3.6   CL 97* 98  --  98   CO2 23 19*  --  20*   BUN 11 12  --  12   CREATININE 0.9 0.8  --  0.8   CALCIUM 9.8 9.0  --  8.7   PHOS  --  3.0  --   --        Recent Labs     05/23/24 1925 05/24/24 0547 05/25/24  0658   ALKPHOS 64 51 52   ALT 79* 66* 54*   * 105* 76*   BILITOT 0.8 0.9 1.0       No results for input(s): \"INR\" in the last 72 hours.    No results for input(s): \"CKTOTAL\", \"TROPONINI\" in the last 72 hours.    Chronic labs:    Lab Results   Component Value Date    CHOL 201 (H) 05/24/2024    TRIG 63 05/24/2024    HDL 90 05/24/2024    TSH 1.30 05/23/2024    INR 1.0 01/14/2024    LABA1C 5.2 05/24/2024       Radiology: REVIEWED DAILY    +++++++++++++++++++++++++++++++++++++++++++++++++  Vy Torres MD  [place], [place]  +++++++++++++++++++++++++++++++++++++++++++++++++  NOTE: This report was transcribed using voice recognition software. Every effort was made to ensure accuracy; however, inadvertent computerized transcription errors may be present.

## 2024-05-26 LAB
ALBUMIN SERPL-MCNC: 4.2 G/DL (ref 3.5–5.2)
ALP SERPL-CCNC: 52 U/L (ref 40–129)
ALT SERPL-CCNC: 70 U/L (ref 0–40)
AMMONIA PLAS-SCNC: 23 UMOL/L (ref 16–60)
ANION GAP SERPL CALCULATED.3IONS-SCNC: 17 MMOL/L (ref 7–16)
AST SERPL-CCNC: 93 U/L (ref 0–39)
BASOPHILS # BLD: 0.05 K/UL (ref 0–0.2)
BASOPHILS NFR BLD: 1 % (ref 0–2)
BILIRUB SERPL-MCNC: 0.9 MG/DL (ref 0–1.2)
BUN SERPL-MCNC: 14 MG/DL (ref 6–23)
CALCIUM SERPL-MCNC: 9 MG/DL (ref 8.6–10.2)
CHLORIDE SERPL-SCNC: 96 MMOL/L (ref 98–107)
CO2 SERPL-SCNC: 18 MMOL/L (ref 22–29)
CREAT SERPL-MCNC: 0.8 MG/DL (ref 0.7–1.2)
EOSINOPHIL # BLD: 0.07 K/UL (ref 0.05–0.5)
EOSINOPHILS RELATIVE PERCENT: 1 % (ref 0–6)
ERYTHROCYTE [DISTWIDTH] IN BLOOD BY AUTOMATED COUNT: 13.1 % (ref 11.5–15)
GFR, ESTIMATED: >90 ML/MIN/1.73M2
GLUCOSE SERPL-MCNC: 91 MG/DL (ref 74–99)
HCT VFR BLD AUTO: 39.7 % (ref 37–54)
HGB BLD-MCNC: 13.6 G/DL (ref 12.5–16.5)
IMM GRANULOCYTES # BLD AUTO: 0.03 K/UL (ref 0–0.58)
IMM GRANULOCYTES NFR BLD: 1 % (ref 0–5)
LYMPHOCYTES NFR BLD: 1.09 K/UL (ref 1.5–4)
LYMPHOCYTES RELATIVE PERCENT: 20 % (ref 20–42)
MCH RBC QN AUTO: 34.5 PG (ref 26–35)
MCHC RBC AUTO-ENTMCNC: 34.3 G/DL (ref 32–34.5)
MCV RBC AUTO: 100.8 FL (ref 80–99.9)
MONOCYTES NFR BLD: 0.96 K/UL (ref 0.1–0.95)
MONOCYTES NFR BLD: 17 % (ref 2–12)
NEUTROPHILS NFR BLD: 60 % (ref 43–80)
NEUTS SEG NFR BLD: 3.33 K/UL (ref 1.8–7.3)
PLATELET # BLD AUTO: 104 K/UL (ref 130–450)
PMV BLD AUTO: 11.2 FL (ref 7–12)
POTASSIUM SERPL-SCNC: 4.1 MMOL/L (ref 3.5–5)
PROT SERPL-MCNC: 6.7 G/DL (ref 6.4–8.3)
RBC # BLD AUTO: 3.94 M/UL (ref 3.8–5.8)
SODIUM SERPL-SCNC: 131 MMOL/L (ref 132–146)
WBC OTHER # BLD: 5.5 K/UL (ref 4.5–11.5)

## 2024-05-26 PROCEDURE — 6370000000 HC RX 637 (ALT 250 FOR IP): Performed by: STUDENT IN AN ORGANIZED HEALTH CARE EDUCATION/TRAINING PROGRAM

## 2024-05-26 PROCEDURE — 2060000000 HC ICU INTERMEDIATE R&B

## 2024-05-26 PROCEDURE — 99232 SBSQ HOSP IP/OBS MODERATE 35: CPT | Performed by: STUDENT IN AN ORGANIZED HEALTH CARE EDUCATION/TRAINING PROGRAM

## 2024-05-26 PROCEDURE — 36415 COLL VENOUS BLD VENIPUNCTURE: CPT

## 2024-05-26 PROCEDURE — 80053 COMPREHEN METABOLIC PANEL: CPT

## 2024-05-26 PROCEDURE — 82140 ASSAY OF AMMONIA: CPT

## 2024-05-26 PROCEDURE — 85025 COMPLETE CBC W/AUTO DIFF WBC: CPT

## 2024-05-26 PROCEDURE — 6360000002 HC RX W HCPCS: Performed by: INTERNAL MEDICINE

## 2024-05-26 PROCEDURE — 2580000003 HC RX 258: Performed by: INTERNAL MEDICINE

## 2024-05-26 PROCEDURE — 6370000000 HC RX 637 (ALT 250 FOR IP): Performed by: CHIROPRACTOR

## 2024-05-26 PROCEDURE — 2580000003 HC RX 258: Performed by: STUDENT IN AN ORGANIZED HEALTH CARE EDUCATION/TRAINING PROGRAM

## 2024-05-26 PROCEDURE — 6370000000 HC RX 637 (ALT 250 FOR IP): Performed by: INTERNAL MEDICINE

## 2024-05-26 RX ORDER — PHENOBARBITAL 32.4 MG/1
32.4 TABLET ORAL 2 TIMES DAILY
Status: DISCONTINUED | OUTPATIENT
Start: 2024-05-27 | End: 2024-05-27

## 2024-05-26 RX ORDER — PHENOBARBITAL SODIUM 65 MG/ML
130 INJECTION, SOLUTION INTRAMUSCULAR; INTRAVENOUS
Status: DISCONTINUED | OUTPATIENT
Start: 2024-05-26 | End: 2024-05-26

## 2024-05-26 RX ORDER — SODIUM BICARBONATE 650 MG/1
650 TABLET ORAL 2 TIMES DAILY
Status: DISCONTINUED | OUTPATIENT
Start: 2024-05-26 | End: 2024-05-27

## 2024-05-26 RX ORDER — SODIUM CHLORIDE 9 MG/ML
INJECTION, SOLUTION INTRAVENOUS CONTINUOUS
Status: DISCONTINUED | OUTPATIENT
Start: 2024-05-26 | End: 2024-05-27

## 2024-05-26 RX ORDER — PHENOBARBITAL 32.4 MG/1
32.4 TABLET ORAL EVERY 6 HOURS PRN
Status: DISCONTINUED | OUTPATIENT
Start: 2024-05-26 | End: 2024-05-26

## 2024-05-26 RX ORDER — PHENOBARBITAL 32.4 MG/1
16.2 TABLET ORAL EVERY 6 HOURS PRN
Status: DISCONTINUED | OUTPATIENT
Start: 2024-05-28 | End: 2024-05-26

## 2024-05-26 RX ORDER — PHENOBARBITAL 32.4 MG/1
32.4 TABLET ORAL 4 TIMES DAILY
Status: COMPLETED | OUTPATIENT
Start: 2024-05-26 | End: 2024-05-27

## 2024-05-26 RX ORDER — PHENOBARBITAL SODIUM 65 MG/ML
65 INJECTION, SOLUTION INTRAMUSCULAR; INTRAVENOUS 2 TIMES DAILY
Status: DISCONTINUED | OUTPATIENT
Start: 2024-05-26 | End: 2024-05-26

## 2024-05-26 RX ORDER — PHENOBARBITAL 32.4 MG/1
16.2 TABLET ORAL 2 TIMES DAILY
Status: DISCONTINUED | OUTPATIENT
Start: 2024-05-28 | End: 2024-05-27

## 2024-05-26 RX ADMIN — LORAZEPAM 2 MG: 1 TABLET ORAL at 06:34

## 2024-05-26 RX ADMIN — Medication 100 MG: at 09:16

## 2024-05-26 RX ADMIN — LORAZEPAM 2 MG: 1 TABLET ORAL at 02:19

## 2024-05-26 RX ADMIN — APIXABAN 10 MG: 5 TABLET, FILM COATED ORAL at 19:42

## 2024-05-26 RX ADMIN — LORAZEPAM 4 MG: 2 INJECTION INTRAMUSCULAR; INTRAVENOUS at 20:41

## 2024-05-26 RX ADMIN — FOLIC ACID 1 MG: 1 TABLET ORAL at 09:16

## 2024-05-26 RX ADMIN — LISINOPRIL 5 MG: 10 TABLET ORAL at 19:42

## 2024-05-26 RX ADMIN — LORAZEPAM 4 MG: 1 TABLET ORAL at 08:08

## 2024-05-26 RX ADMIN — LORAZEPAM 2 MG: 1 TABLET ORAL at 09:24

## 2024-05-26 RX ADMIN — LORAZEPAM 4 MG: 2 INJECTION INTRAMUSCULAR; INTRAVENOUS at 16:52

## 2024-05-26 RX ADMIN — LORAZEPAM 4 MG: 2 INJECTION INTRAMUSCULAR; INTRAVENOUS at 18:19

## 2024-05-26 RX ADMIN — LORAZEPAM 4 MG: 2 INJECTION INTRAMUSCULAR; INTRAVENOUS at 14:53

## 2024-05-26 RX ADMIN — SODIUM CHLORIDE, PRESERVATIVE FREE 10 ML: 5 INJECTION INTRAVENOUS at 09:16

## 2024-05-26 RX ADMIN — LORAZEPAM 2 MG: 2 INJECTION INTRAMUSCULAR; INTRAVENOUS at 12:20

## 2024-05-26 RX ADMIN — LORAZEPAM 4 MG: 2 INJECTION INTRAMUSCULAR; INTRAVENOUS at 19:42

## 2024-05-26 RX ADMIN — LORAZEPAM 2 MG: 1 TABLET ORAL at 05:06

## 2024-05-26 RX ADMIN — SODIUM CHLORIDE: 9 INJECTION, SOLUTION INTRAVENOUS at 11:52

## 2024-05-26 RX ADMIN — PHENOBARBITAL 32.4 MG: 32.4 TABLET ORAL at 11:54

## 2024-05-26 RX ADMIN — LORAZEPAM 3 MG: 1 TABLET ORAL at 13:39

## 2024-05-26 RX ADMIN — LORAZEPAM 3 MG: 1 TABLET ORAL at 03:55

## 2024-05-26 RX ADMIN — PHENOBARBITAL 32.4 MG: 32.4 TABLET ORAL at 19:42

## 2024-05-26 RX ADMIN — LORAZEPAM 4 MG: 2 INJECTION INTRAMUSCULAR; INTRAVENOUS at 10:25

## 2024-05-26 RX ADMIN — LISINOPRIL 5 MG: 10 TABLET ORAL at 09:16

## 2024-05-26 RX ADMIN — APIXABAN 10 MG: 5 TABLET, FILM COATED ORAL at 09:16

## 2024-05-26 RX ADMIN — SODIUM BICARBONATE 650 MG: 650 TABLET ORAL at 19:42

## 2024-05-26 RX ADMIN — SODIUM BICARBONATE 650 MG: 650 TABLET ORAL at 11:55

## 2024-05-26 NOTE — CODE DOCUMENTATION
Phenobarbital orders placed. Patient stable and will remain on current level of care. Current CIWA score 19.

## 2024-05-26 NOTE — SIGNIFICANT EVENT
Rapid Response Team Note  Date of event: 5/26/2024   Time of event: 11:24 am  Juan Francisco Jaquez 63 y.o. year old male   YOB: 1960   Admit date:  5/23/2024   Location: 74/7424-A   Witnessed? : [x]Yes  [] No  Monitored? : [x]Yes  [] No  Code status: [x] Full  [] DNR-CCA  []DNR-CC  ______________________________________________________________________  Reason for RRT:    [] RR < 8     [] RR > 28   [] SpO2 <90%   [] HR < 40 bpm   [] HR > 130 bpm  [] SBP < 90 mmHg    [] SpO2 <90%   [] LOC   [] Seizures    [] Significant Bleeding Event    [x] Other: Alcohol withdrawal    Subjective:   CTSP regarding the above event, patient came with dizziness, found to have PE, is on Eliquis.  Significant history of drinking 2 pint whiskey per day for years, last drink being 3 days back.  An RRT was called for CIWA score 26.  Patient got 4 mg Ativan, CIWA score went down to 19.  Upon arrival, the patient was calm, saturating 94%.  No respiratory distress noted.  Patient was started on low-dose phenobarb taper.  Serum ammonia level was ordered.  Decision was made to observe the patient in floor.  If respiratory status worsens, nurse was instructed to call RRT and patient might need to be transferred to unit.    Objective:   Vital signs: Temp: 97.2/BP: 139/97/RR: 18/HR: 88  Initial Condition:  Conscious   [x] Yes  [] No     Breathing [x] Yes  [] No     Pulse  [x] Yes  [] No    Airway:   [x] Open/ Clear     Intervention: [x] None  [] Pooled secretions     [] Suctioned  [] Stridor      [] Intubation    Lungs:   [x] Symmetrical chest rise/ CTABL Intervention: [x] None  [] Use of accessory muscles    [] NIV (CPAP/BiPAP)  [] Cyanosis      [] Nasal Oxygen/Mask  [] Wheezing       [] ABG             [] CXR  [] Other:     Circulation:   Rhythm:  [x] Sinus [] Other:   Intervention: [x] None            [] IV Access  [] Peripheral              [] Central            [] EKG            [] Cardioversion            []  Defibrillation     Capillary Refill:  [] > 2 seconds [] < 2 seconds    Neurologic:   [] NIHSS      [] Pupillary Response:     Response to pain:   [] Yes  [] No  Follow commands:  [] Yes  [] No  Facial asymmetry:  [] Yes  [] No  Motor strength:  [] Equal  [] Focal deficit:   Other: mild shaking of hands  Diagnostic Test:  Blood Sugar:  99 mg/dL  ABG:    No results found for: \"PH\", \"PCO2\", \"PO2\", \"HCO3\", \"O2SAT\"  Medication(s) Given:  []  Narcan (Naloxone)   []  Romazicon (Flumazenil)    []  Breathing Treatment    []  Steroids (Prednisone, Solu-Medrol)  []  Adenosine  [x] Phenobarbital 32.4 mg      Infusion(s):   [] Normal Saline              Imaging:   [] CXR:   [] Normal         [] Pneumothorax         [] Pulmonary Edema  [] Infiltrate          [] CT Head  [] Normal          [] ICB          [] SAH          [] Other    Laboratory Tests:   Serum Ammonia level      Teams Assessment and Plan:  Acute alcohol withdrawal, CIWA score 26 > 19  Plan:  Ordered phenobarbital low-dose taper  Continue assessing CIWA  Follow serum ammonia level  Clinical follow-up, if worsens clinically, need to call another RRT  ?  ?  ?  Disposition:  [x] No transfer   [] Transfer to monitor floor  [] Transfer to: [] MICU [] NICU [] CCU [] SICU    Patient’s family updated: [] Yes  [x] No   Discussed with:  [] Critical Care Intensivist:      [x] Primary Care Provider: Dr. Vy Torres      [] Other:     Kee Lou MD PGY-1  5/26/2024 1:03 PM  Attending Physician: Dr. Torres

## 2024-05-26 NOTE — PLAN OF CARE
Problem: Discharge Planning  Goal: Discharge to home or other facility with appropriate resources  5/25/2024 2334 by Haven Price RN  Outcome: Progressing  5/25/2024 1848 by Gladys Delgadillo RN  Outcome: Progressing  Flowsheets (Taken 5/25/2024 0800 by Kerry Weiss RN)  Discharge to home or other facility with appropriate resources: Identify barriers to discharge with patient and caregiver     Problem: Pain  Goal: Verbalizes/displays adequate comfort level or baseline comfort level  5/25/2024 2334 by Haven Price RN  Outcome: Progressing  5/25/2024 1848 by Gladys Delgadillo RN  Outcome: Progressing  5/25/2024 1119 by Kerry Weiss RN  Flowsheets (Taken 5/25/2024 1119)  Verbalizes/displays adequate comfort level or baseline comfort level:   Encourage patient to monitor pain and request assistance   Assess pain using appropriate pain scale     Problem: Safety - Adult  Goal: Free from fall injury  5/25/2024 2334 by Haven Price RN  Outcome: Progressing  Flowsheets (Taken 5/25/2024 2333)  Free From Fall Injury: Instruct family/caregiver on patient safety  5/25/2024 1848 by Gladys Delgadillo RN  Outcome: Progressing  5/25/2024 1119 by Kerry eWiss RN  Flowsheets (Taken 5/25/2024 1119)  Free From Fall Injury: Based on caregiver fall risk screen, instruct family/caregiver to ask for assistance with transferring infant if caregiver noted to have fall risk factors

## 2024-05-26 NOTE — PROGRESS NOTES
Hospitalist Progress Note      Chief Complaint:  had concerns including Dizziness (Patient states he has been feeling lightheaded,shaky, diaphoretic and weak the last couple weeks. Has echo scheduled for ).    Admission Date: 2024     SYNOPSIS: Juan Francisco Jaquez has a past medical history that includes coronary artery disease,  , alcohol abuse, hypertension, mitral regurg   Juan Francisco presented to the ER with complaint of lightheadedness, diaphoresis, shakiness, weakness starting a few days ago. He said he was at work and was feeling like he was going to pass out.   CTA suggestive of Right PE was started on heparin Drip , USG LE showing B/L DVT On CIWA score for alcohol withdrawal   : CIWA scores 26 this am despite being medicated with ativan, RRT called, patient was started on tapering dose of Phenobarbital along with PRN Ativan per CIWA scores. He appeared to be calm during RRT so patient remained in IMC, if continues to worsen or DT noted will be transferred to ICU for further monitoring     SUBJECTIVE:    Patient Seen and examined at bedside, He has shaking and tremors 2/2 Alcohol withdrawal  Records reviewed. Stable overnight. No overnight issues reported     Temp (24hrs), Av.1 °F (36.7 °C), Min:97.2 °F (36.2 °C), Max:98.8 °F (37.1 °C)    DIET: ADULT DIET; Regular  CODE: Full Code    Intake/Output Summary (Last 24 hours) at 2024 1207  Last data filed at 2024 1756  Gross per 24 hour   Intake 240 ml   Output --   Net 240 ml       OBJECTIVE:    BP (!) 139/97   Pulse 83   Temp 97.2 °F (36.2 °C) (Temporal)   Resp 14   Ht 1.753 m (5' 9\")   Wt 73.9 kg (163 lb)   SpO2 95%   BMI 24.07 kg/m²     General appearance: No apparent distress, appears stated age and cooperative.   HEENT:  Normocephalic. Conjunctivae/corneas clear. Moist mucosa   Neck: Supple. No jugular venous distention. Thyroid not visible, non tender   Respiratory: Normal respiratory effort. Clear to auscultation  IntraVENous 2 times per day    lisinopril  5 mg Oral 2 times per day    thiamine  100 mg Oral Daily    folic acid  1 mg Oral Daily    sodium chloride flush  5-40 mL IntraVENous 2 times per day     PRN Meds: benzocaine-menthol, benzonatate, calcium carbonate, hydrALAZINE, melatonin, Polyvinyl Alcohol-Povidone PF, sodium chloride, sodium chloride flush, sodium chloride, potassium chloride **OR** potassium alternative oral replacement **OR** potassium chloride, magnesium sulfate, ondansetron **OR** ondansetron, polyethylene glycol, acetaminophen **OR** acetaminophen, sodium chloride flush, sodium chloride, LORazepam **OR** LORazepam **OR** LORazepam **OR** LORazepam **OR** LORazepam **OR** LORazepam **OR** LORazepam **OR** LORazepam    Labs:     Recent Labs     05/24/24  0547 05/25/24  0658 05/26/24  0603   WBC 5.0 4.6 5.5   HGB 13.9 13.5 13.6   HCT 39.7 39.2 39.7   * 101* 104*       Recent Labs     05/24/24  0547 05/24/24  0845 05/25/24  0658 05/26/24  0603     --  134 131*   K 5.2* 4.3 3.6 4.1   CL 98  --  98 96*   CO2 19*  --  20* 18*   BUN 12  --  12 14   CREATININE 0.8  --  0.8 0.8   CALCIUM 9.0  --  8.7 9.0   PHOS 3.0  --   --   --        Recent Labs     05/24/24  0547 05/25/24  0658 05/26/24  0603   ALKPHOS 51 52 52   ALT 66* 54* 70*   * 76* 93*   BILITOT 0.9 1.0 0.9       No results for input(s): \"INR\" in the last 72 hours.    No results for input(s): \"CKTOTAL\", \"TROPONINI\" in the last 72 hours.    Chronic labs:    Lab Results   Component Value Date    CHOL 201 (H) 05/24/2024    TRIG 63 05/24/2024    HDL 90 05/24/2024    TSH 1.30 05/23/2024    INR 1.0 01/14/2024    LABA1C 5.2 05/24/2024       Radiology: REVIEWED DAILY    +++++++++++++++++++++++++++++++++++++++++++++++++  yV Torres MD  [place], [place]  +++++++++++++++++++++++++++++++++++++++++++++++++  NOTE: This report was transcribed using voice recognition software.

## 2024-05-27 LAB
ALBUMIN SERPL-MCNC: 4.3 G/DL (ref 3.5–5.2)
ALP SERPL-CCNC: 64 U/L (ref 40–129)
ALT SERPL-CCNC: 76 U/L (ref 0–40)
ANION GAP SERPL CALCULATED.3IONS-SCNC: 20 MMOL/L (ref 7–16)
AST SERPL-CCNC: 93 U/L (ref 0–39)
BASOPHILS # BLD: 0.05 K/UL (ref 0–0.2)
BASOPHILS NFR BLD: 1 % (ref 0–2)
BILIRUB SERPL-MCNC: 1 MG/DL (ref 0–1.2)
BUN SERPL-MCNC: 12 MG/DL (ref 6–23)
CALCIUM SERPL-MCNC: 8.8 MG/DL (ref 8.6–10.2)
CHLORIDE SERPL-SCNC: 98 MMOL/L (ref 98–107)
CO2 SERPL-SCNC: 16 MMOL/L (ref 22–29)
CREAT SERPL-MCNC: 0.7 MG/DL (ref 0.7–1.2)
EOSINOPHIL # BLD: 0.05 K/UL (ref 0.05–0.5)
EOSINOPHILS RELATIVE PERCENT: 1 % (ref 0–6)
ERYTHROCYTE [DISTWIDTH] IN BLOOD BY AUTOMATED COUNT: 12.6 % (ref 11.5–15)
GFR, ESTIMATED: >90 ML/MIN/1.73M2
GLUCOSE SERPL-MCNC: 77 MG/DL (ref 74–99)
HCT VFR BLD AUTO: 41.9 % (ref 37–54)
HGB BLD-MCNC: 14.5 G/DL (ref 12.5–16.5)
IMM GRANULOCYTES # BLD AUTO: 0.03 K/UL (ref 0–0.58)
IMM GRANULOCYTES NFR BLD: 0 % (ref 0–5)
LYMPHOCYTES NFR BLD: 1.15 K/UL (ref 1.5–4)
LYMPHOCYTES RELATIVE PERCENT: 14 % (ref 20–42)
MCH RBC QN AUTO: 34.9 PG (ref 26–35)
MCHC RBC AUTO-ENTMCNC: 34.6 G/DL (ref 32–34.5)
MCV RBC AUTO: 100.7 FL (ref 80–99.9)
MONOCYTES NFR BLD: 1.24 K/UL (ref 0.1–0.95)
MONOCYTES NFR BLD: 15 % (ref 2–12)
NEUTROPHILS NFR BLD: 69 % (ref 43–80)
NEUTS SEG NFR BLD: 5.51 K/UL (ref 1.8–7.3)
PLATELET # BLD AUTO: 117 K/UL (ref 130–450)
PMV BLD AUTO: 11.6 FL (ref 7–12)
POTASSIUM SERPL-SCNC: 4 MMOL/L (ref 3.5–5)
PROT SERPL-MCNC: 7.1 G/DL (ref 6.4–8.3)
RBC # BLD AUTO: 4.16 M/UL (ref 3.8–5.8)
SODIUM SERPL-SCNC: 134 MMOL/L (ref 132–146)
WBC OTHER # BLD: 8 K/UL (ref 4.5–11.5)

## 2024-05-27 PROCEDURE — A4216 STERILE WATER/SALINE, 10 ML: HCPCS | Performed by: STUDENT IN AN ORGANIZED HEALTH CARE EDUCATION/TRAINING PROGRAM

## 2024-05-27 PROCEDURE — 2580000003 HC RX 258: Performed by: STUDENT IN AN ORGANIZED HEALTH CARE EDUCATION/TRAINING PROGRAM

## 2024-05-27 PROCEDURE — 6370000000 HC RX 637 (ALT 250 FOR IP): Performed by: CHIROPRACTOR

## 2024-05-27 PROCEDURE — 93005 ELECTROCARDIOGRAM TRACING: CPT | Performed by: STUDENT IN AN ORGANIZED HEALTH CARE EDUCATION/TRAINING PROGRAM

## 2024-05-27 PROCEDURE — 36415 COLL VENOUS BLD VENIPUNCTURE: CPT

## 2024-05-27 PROCEDURE — C9113 INJ PANTOPRAZOLE SODIUM, VIA: HCPCS | Performed by: STUDENT IN AN ORGANIZED HEALTH CARE EDUCATION/TRAINING PROGRAM

## 2024-05-27 PROCEDURE — 6370000000 HC RX 637 (ALT 250 FOR IP): Performed by: INTERNAL MEDICINE

## 2024-05-27 PROCEDURE — 2060000000 HC ICU INTERMEDIATE R&B

## 2024-05-27 PROCEDURE — 2580000003 HC RX 258: Performed by: INTERNAL MEDICINE

## 2024-05-27 PROCEDURE — 80053 COMPREHEN METABOLIC PANEL: CPT

## 2024-05-27 PROCEDURE — 6360000002 HC RX W HCPCS: Performed by: INTERNAL MEDICINE

## 2024-05-27 PROCEDURE — 85025 COMPLETE CBC W/AUTO DIFF WBC: CPT

## 2024-05-27 PROCEDURE — 6370000000 HC RX 637 (ALT 250 FOR IP): Performed by: STUDENT IN AN ORGANIZED HEALTH CARE EDUCATION/TRAINING PROGRAM

## 2024-05-27 PROCEDURE — 6360000002 HC RX W HCPCS: Performed by: STUDENT IN AN ORGANIZED HEALTH CARE EDUCATION/TRAINING PROGRAM

## 2024-05-27 PROCEDURE — 99232 SBSQ HOSP IP/OBS MODERATE 35: CPT | Performed by: STUDENT IN AN ORGANIZED HEALTH CARE EDUCATION/TRAINING PROGRAM

## 2024-05-27 RX ORDER — QUETIAPINE FUMARATE 25 MG/1
25 TABLET, FILM COATED ORAL 2 TIMES DAILY
Status: DISCONTINUED | OUTPATIENT
Start: 2024-05-27 | End: 2024-05-28

## 2024-05-27 RX ORDER — SODIUM CHLORIDE 9 MG/ML
INJECTION, SOLUTION INTRAVENOUS CONTINUOUS
Status: ACTIVE | OUTPATIENT
Start: 2024-05-27 | End: 2024-05-28

## 2024-05-27 RX ORDER — LORAZEPAM 2 MG/ML
2 INJECTION INTRAMUSCULAR EVERY 6 HOURS
Status: DISCONTINUED | OUTPATIENT
Start: 2024-05-27 | End: 2024-05-28

## 2024-05-27 RX ORDER — SODIUM BICARBONATE 650 MG/1
1300 TABLET ORAL 2 TIMES DAILY
Status: DISCONTINUED | OUTPATIENT
Start: 2024-05-27 | End: 2024-05-30

## 2024-05-27 RX ORDER — BENZOCAINE/MENTHOL 6 MG-10 MG
LOZENGE MUCOUS MEMBRANE 2 TIMES DAILY
Status: DISCONTINUED | OUTPATIENT
Start: 2024-05-27 | End: 2024-05-30 | Stop reason: HOSPADM

## 2024-05-27 RX ADMIN — LORAZEPAM 2 MG: 2 INJECTION INTRAMUSCULAR; INTRAVENOUS at 13:57

## 2024-05-27 RX ADMIN — HYDROCORTISONE: 1 CREAM TOPICAL at 21:04

## 2024-05-27 RX ADMIN — LORAZEPAM 2 MG: 2 INJECTION INTRAMUSCULAR; INTRAVENOUS at 21:04

## 2024-05-27 RX ADMIN — PANTOPRAZOLE SODIUM 40 MG: 40 INJECTION, POWDER, FOR SOLUTION INTRAVENOUS at 18:45

## 2024-05-27 RX ADMIN — LORAZEPAM 4 MG: 2 INJECTION INTRAMUSCULAR; INTRAVENOUS at 01:21

## 2024-05-27 RX ADMIN — SODIUM CHLORIDE: 9 INJECTION, SOLUTION INTRAVENOUS at 08:50

## 2024-05-27 RX ADMIN — SODIUM CHLORIDE, PRESERVATIVE FREE 10 ML: 5 INJECTION INTRAVENOUS at 08:06

## 2024-05-27 RX ADMIN — APIXABAN 10 MG: 5 TABLET, FILM COATED ORAL at 08:06

## 2024-05-27 RX ADMIN — QUETIAPINE FUMARATE 25 MG: 25 TABLET ORAL at 08:40

## 2024-05-27 RX ADMIN — QUETIAPINE FUMARATE 25 MG: 25 TABLET ORAL at 21:04

## 2024-05-27 RX ADMIN — Medication 100 MG: at 08:40

## 2024-05-27 RX ADMIN — PHENOBARBITAL 32.4 MG: 32.4 TABLET ORAL at 12:39

## 2024-05-27 RX ADMIN — SODIUM BICARBONATE 1300 MG: 650 TABLET ORAL at 21:04

## 2024-05-27 RX ADMIN — FOLIC ACID 1 MG: 1 TABLET ORAL at 08:06

## 2024-05-27 RX ADMIN — LORAZEPAM 2 MG: 2 INJECTION INTRAMUSCULAR; INTRAVENOUS at 17:33

## 2024-05-27 RX ADMIN — PHENOBARBITAL 32.4 MG: 32.4 TABLET ORAL at 08:05

## 2024-05-27 RX ADMIN — LISINOPRIL 5 MG: 10 TABLET ORAL at 08:05

## 2024-05-27 RX ADMIN — LORAZEPAM 4 MG: 2 INJECTION INTRAMUSCULAR; INTRAVENOUS at 04:15

## 2024-05-27 RX ADMIN — ONDANSETRON 4 MG: 2 INJECTION INTRAMUSCULAR; INTRAVENOUS at 17:48

## 2024-05-27 RX ADMIN — LISINOPRIL 5 MG: 10 TABLET ORAL at 21:05

## 2024-05-27 RX ADMIN — LORAZEPAM 2 MG: 2 INJECTION INTRAMUSCULAR; INTRAVENOUS at 14:56

## 2024-05-27 RX ADMIN — SODIUM CHLORIDE, PRESERVATIVE FREE 10 ML: 5 INJECTION INTRAVENOUS at 21:05

## 2024-05-27 RX ADMIN — APIXABAN 10 MG: 5 TABLET, FILM COATED ORAL at 21:05

## 2024-05-27 RX ADMIN — AMPICILLIN SODIUM AND SULBACTAM SODIUM 3000 MG: 2; 1 INJECTION, POWDER, FOR SOLUTION INTRAMUSCULAR; INTRAVENOUS at 18:44

## 2024-05-27 RX ADMIN — SODIUM BICARBONATE 1300 MG: 650 TABLET ORAL at 08:05

## 2024-05-27 NOTE — PROGRESS NOTES
[facility]  [address], [address]  [place] [address]      Pulmonary Progress Note      SUBJECTIVE:  somnolent as got seroquel earlier, on RA lying down in bed, not looking sob, no cough during examination, had been more awake and agitated earlier this morning and yesterday, required RRT due to agitation    OBJECTIVE    Medications    Continuous Infusions:   sodium chloride 100 mL/hr at 05/27/24 0850    sodium chloride      sodium chloride         Scheduled Meds:   sodium bicarbonate  1,300 mg Oral BID    QUEtiapine  25 mg Oral BID    PHENobarbital  32.4 mg Oral 4x daily    Followed by    PHENobarbital  32.4 mg Oral BID    Followed by    [START ON 5/28/2024] PHENobarbital  16.2 mg Oral BID    apixaban  10 mg Oral BID    Followed by    [START ON 6/1/2024] apixaban  5 mg Oral BID    sodium chloride flush  5-40 mL IntraVENous 2 times per day    lisinopril  5 mg Oral 2 times per day    thiamine  100 mg Oral Daily    folic acid  1 mg Oral Daily    sodium chloride flush  5-40 mL IntraVENous 2 times per day       PRN Meds:benzocaine-menthol, benzonatate, calcium carbonate, hydrALAZINE, melatonin, Polyvinyl Alcohol-Povidone PF, sodium chloride, sodium chloride flush, sodium chloride, potassium chloride **OR** potassium alternative oral replacement **OR** potassium chloride, magnesium sulfate, ondansetron **OR** ondansetron, polyethylene glycol, acetaminophen **OR** acetaminophen, sodium chloride flush, sodium chloride, LORazepam **OR** LORazepam **OR** LORazepam **OR** LORazepam **OR** LORazepam **OR** LORazepam **OR** LORazepam **OR** LORazepam    Physical    VITALS:  /67   Pulse 85   Temp 97.8 °F (36.6 °C) (Temporal)   Resp 16   Ht 1.753 m (5' 9\")   Wt 73.9 kg (163 lb)   SpO2 98%   BMI 24.07 kg/m²     24HR INTAKE/OUTPUT:      Intake/Output Summary (Last 24 hours) at 5/27/2024 1149  Last data filed at 5/27/2024 1110  Gross per 24 hour    Intake 300 ml   Output 900 ml   Net -600 ml         24HR PULSE OXIMETRY RANGE:    SpO2  Av.4 %  Min: 95 %  Max: 100 %    General appearance: somnolent  Lungs: clear to auscultation bilaterally  Heart: regular rate and rhythm, S1, S2 normal, no murmur, click, rub or gallop  Abdomen: soft, non-tender; bowel sounds normal; no masses,  no organomegaly  Extremities: extremities normal, atraumatic, no cyanosis or edema  Neurologic: Mental status: somnolent    Data    CBC:   Recent Labs     24  0658 24  0603 24  0548   WBC 4.6 5.5 8.0   HGB 13.5 13.6 14.5   HCT 39.2 39.7 41.9   .3* 100.8* 100.7*   * 104* 117*         BMP:  Recent Labs     24  0624  0548    131* 134   K 3.6 4.1 4.0   CL 98 96* 98   CO2 20* 18* 16*   BUN 12 14 12   CREATININE 0.8 0.8 0.7      ALB:3,BILIDIR:3,BILITOT:3,ALKPHOS:3)@    PT/INR: No results for input(s): \"PROTIME\", \"INR\" in the last 72 hours.    ABG:   No results for input(s): \"PH\", \"PO2\", \"PCO2\", \"HCO3\", \"BE\", \"O2SAT\", \"METHB\", \"O2HB\", \"COHB\", \"O2CON\", \"HHB\", \"THB\" in the last 72 hours.          Radiology/Other tests reviewed: LE dopplers with (+) DVT    Assessment:     Principal Problem:    Pulmonary embolism on right (HCC)  Active Problems:    Alcohol abuse    Hypertension  Resolved Problems:    * No resolved hospital problems. *      Plan:       ETOH withdrawal as per PCP  On RA and no lung medications, observe respiratory function and saturations, discussed with nurse to have pulse oximeter to watch oxygenation  Cont with DOAC, (?) place on SQ lovenox or IV heparin if unable to take po, watch tolerance due to current history of ETOH intake and increased bleeding risk      Time at the bedside, reviewing labs and radiographs, reviewing notes and consultations, discussing with staff and family was more than 50 minutes.      Thanks for letting us see this patient in consultation.  Please contact us with any questions.

## 2024-05-27 NOTE — PROGRESS NOTES
Hospitalist Progress Note      Chief Complaint:  had concerns including Dizziness (Patient states he has been feeling lightheaded,shaky, diaphoretic and weak the last couple weeks. Has echo scheduled for ).    Admission Date: 2024     SYNOPSIS: Juan Francisco Jaquez has a past medical history that includes coronary artery disease,  , alcohol abuse, hypertension, mitral regurg   Juan Francisco presented to the ER with complaint of lightheadedness, diaphoresis, shakiness, weakness starting a few days ago. He said he was at work and was feeling like he was going to pass out.   CTA suggestive of Right PE was started on heparin Drip , USG LE showing B/L DVT On CIWA score for alcohol withdrawal   : CIWA scores 26 this am despite being medicated with ativan, RRT called, patient was started on tapering dose of Phenobarbital along with PRN Ativan per CIWA scores. He appeared to be calm during RRT so patient remained in IMC, if continues to worsen or DT noted will be transferred to ICU for further monitoring   : Seroquel added for withdrawal related agitation,Monitor QtC.    Diffuse Maculopapular rash noted over the back, he was started on phenobarbital yesterday for alcohol withdrawal. Could be reaction from the medication, Phenobarbital discontinued.  Will use Hydrocortisone cream for the rash and conservative management. No Eosinophilia noted in the CBC,Mildly elevated LFTs but has been high since admission AST > ALT probably due to alcohol use will repeat in am.    SUBJECTIVE:    Patient Seen and examined at bedside, He has shaking and tremors 2/2 Alcohol withdrawal  Records reviewed. Stable overnight. No overnight issues reported     Temp (24hrs), Av.6 °F (36.4 °C), Min:97.2 °F (36.2 °C), Max:98.1 °F (36.7 °C)    DIET: ADULT DIET; Regular  CODE: Full Code    Intake/Output Summary (Last 24 hours) at 2024 1057  Last data filed at 2024 09  Gross per 24 hour   Intake 300 ml   Output 550 ml   Net

## 2024-05-28 ENCOUNTER — APPOINTMENT (OUTPATIENT)
Dept: GENERAL RADIOLOGY | Age: 64
End: 2024-05-28
Payer: COMMERCIAL

## 2024-05-28 LAB
ALBUMIN SERPL-MCNC: 4.2 G/DL (ref 3.5–5.2)
ALP SERPL-CCNC: 64 U/L (ref 40–129)
ALT SERPL-CCNC: 64 U/L (ref 0–40)
ANION GAP SERPL CALCULATED.3IONS-SCNC: 19 MMOL/L (ref 7–16)
AST SERPL-CCNC: 62 U/L (ref 0–39)
BASOPHILS # BLD: 0.04 K/UL (ref 0–0.2)
BASOPHILS NFR BLD: 1 % (ref 0–2)
BILIRUB SERPL-MCNC: 0.9 MG/DL (ref 0–1.2)
BUN SERPL-MCNC: 14 MG/DL (ref 6–23)
CALCIUM SERPL-MCNC: 9.2 MG/DL (ref 8.6–10.2)
CHLORIDE SERPL-SCNC: 97 MMOL/L (ref 98–107)
CO2 SERPL-SCNC: 20 MMOL/L (ref 22–29)
CREAT SERPL-MCNC: 0.8 MG/DL (ref 0.7–1.2)
EKG ATRIAL RATE: 96 BPM
EKG P AXIS: 59 DEGREES
EKG P-R INTERVAL: 190 MS
EKG Q-T INTERVAL: 380 MS
EKG QRS DURATION: 94 MS
EKG QTC CALCULATION (BAZETT): 480 MS
EKG R AXIS: -55 DEGREES
EKG T AXIS: 0 DEGREES
EKG VENTRICULAR RATE: 96 BPM
EOSINOPHIL # BLD: 0.12 K/UL (ref 0.05–0.5)
EOSINOPHILS RELATIVE PERCENT: 2 % (ref 0–6)
ERYTHROCYTE [DISTWIDTH] IN BLOOD BY AUTOMATED COUNT: 12.7 % (ref 11.5–15)
FOLATE SERPL-MCNC: 19.4 NG/ML (ref 4.8–24.2)
GFR, ESTIMATED: >90 ML/MIN/1.73M2
GLUCOSE BLD-MCNC: 140 MG/DL (ref 74–99)
GLUCOSE SERPL-MCNC: 66 MG/DL (ref 74–99)
HCT VFR BLD AUTO: 41.9 % (ref 37–54)
HGB BLD-MCNC: 14.3 G/DL (ref 12.5–16.5)
IMM GRANULOCYTES # BLD AUTO: 0.03 K/UL (ref 0–0.58)
IMM GRANULOCYTES NFR BLD: 0 % (ref 0–5)
LYMPHOCYTES NFR BLD: 0.99 K/UL (ref 1.5–4)
LYMPHOCYTES RELATIVE PERCENT: 12 % (ref 20–42)
MCH RBC QN AUTO: 34.9 PG (ref 26–35)
MCHC RBC AUTO-ENTMCNC: 34.1 G/DL (ref 32–34.5)
MCV RBC AUTO: 102.2 FL (ref 80–99.9)
MONOCYTES NFR BLD: 1.09 K/UL (ref 0.1–0.95)
MONOCYTES NFR BLD: 14 % (ref 2–12)
NEUTROPHILS NFR BLD: 72 % (ref 43–80)
NEUTS SEG NFR BLD: 5.77 K/UL (ref 1.8–7.3)
PLATELET # BLD AUTO: 128 K/UL (ref 130–450)
PMV BLD AUTO: 10.8 FL (ref 7–12)
POTASSIUM SERPL-SCNC: 3.7 MMOL/L (ref 3.5–5)
PROCALCITONIN SERPL-MCNC: 0.31 NG/ML (ref 0–0.08)
PROT SERPL-MCNC: 6.7 G/DL (ref 6.4–8.3)
RBC # BLD AUTO: 4.1 M/UL (ref 3.8–5.8)
SODIUM SERPL-SCNC: 136 MMOL/L (ref 132–146)
VIT B12 SERPL-MCNC: >2000 PG/ML (ref 211–946)
WBC OTHER # BLD: 8 K/UL (ref 4.5–11.5)

## 2024-05-28 PROCEDURE — 2580000003 HC RX 258: Performed by: STUDENT IN AN ORGANIZED HEALTH CARE EDUCATION/TRAINING PROGRAM

## 2024-05-28 PROCEDURE — 2060000000 HC ICU INTERMEDIATE R&B

## 2024-05-28 PROCEDURE — 84145 PROCALCITONIN (PCT): CPT

## 2024-05-28 PROCEDURE — 82746 ASSAY OF FOLIC ACID SERUM: CPT

## 2024-05-28 PROCEDURE — 36415 COLL VENOUS BLD VENIPUNCTURE: CPT

## 2024-05-28 PROCEDURE — 93010 ELECTROCARDIOGRAM REPORT: CPT | Performed by: INTERNAL MEDICINE

## 2024-05-28 PROCEDURE — 2580000003 HC RX 258: Performed by: INTERNAL MEDICINE

## 2024-05-28 PROCEDURE — 80053 COMPREHEN METABOLIC PANEL: CPT

## 2024-05-28 PROCEDURE — 82962 GLUCOSE BLOOD TEST: CPT

## 2024-05-28 PROCEDURE — 6370000000 HC RX 637 (ALT 250 FOR IP): Performed by: STUDENT IN AN ORGANIZED HEALTH CARE EDUCATION/TRAINING PROGRAM

## 2024-05-28 PROCEDURE — A4216 STERILE WATER/SALINE, 10 ML: HCPCS | Performed by: STUDENT IN AN ORGANIZED HEALTH CARE EDUCATION/TRAINING PROGRAM

## 2024-05-28 PROCEDURE — 6370000000 HC RX 637 (ALT 250 FOR IP): Performed by: INTERNAL MEDICINE

## 2024-05-28 PROCEDURE — C9113 INJ PANTOPRAZOLE SODIUM, VIA: HCPCS | Performed by: STUDENT IN AN ORGANIZED HEALTH CARE EDUCATION/TRAINING PROGRAM

## 2024-05-28 PROCEDURE — 71045 X-RAY EXAM CHEST 1 VIEW: CPT

## 2024-05-28 PROCEDURE — 82607 VITAMIN B-12: CPT

## 2024-05-28 PROCEDURE — 99232 SBSQ HOSP IP/OBS MODERATE 35: CPT | Performed by: STUDENT IN AN ORGANIZED HEALTH CARE EDUCATION/TRAINING PROGRAM

## 2024-05-28 PROCEDURE — 85025 COMPLETE CBC W/AUTO DIFF WBC: CPT

## 2024-05-28 PROCEDURE — 6360000002 HC RX W HCPCS: Performed by: STUDENT IN AN ORGANIZED HEALTH CARE EDUCATION/TRAINING PROGRAM

## 2024-05-28 RX ORDER — QUETIAPINE FUMARATE 25 MG/1
12.5 TABLET, FILM COATED ORAL 2 TIMES DAILY
Status: DISCONTINUED | OUTPATIENT
Start: 2024-05-31 | End: 2024-05-30 | Stop reason: HOSPADM

## 2024-05-28 RX ORDER — CHLORDIAZEPOXIDE HYDROCHLORIDE 25 MG/1
25 CAPSULE, GELATIN COATED ORAL 2 TIMES DAILY
Status: DISCONTINUED | OUTPATIENT
Start: 2024-05-30 | End: 2024-05-30 | Stop reason: HOSPADM

## 2024-05-28 RX ORDER — CHLORDIAZEPOXIDE HYDROCHLORIDE 25 MG/1
25 CAPSULE, GELATIN COATED ORAL
Status: DISCONTINUED | OUTPATIENT
Start: 2024-05-31 | End: 2024-05-28 | Stop reason: SDUPTHER

## 2024-05-28 RX ORDER — CHLORDIAZEPOXIDE HYDROCHLORIDE 25 MG/1
25 CAPSULE, GELATIN COATED ORAL 4 TIMES DAILY
Status: DISCONTINUED | OUTPATIENT
Start: 2024-05-28 | End: 2024-05-28 | Stop reason: SDUPTHER

## 2024-05-28 RX ORDER — CHLORDIAZEPOXIDE HYDROCHLORIDE 25 MG/1
25 CAPSULE, GELATIN COATED ORAL 3 TIMES DAILY
Status: DISCONTINUED | OUTPATIENT
Start: 2024-05-29 | End: 2024-05-28 | Stop reason: SDUPTHER

## 2024-05-28 RX ORDER — CHLORDIAZEPOXIDE HYDROCHLORIDE 25 MG/1
25 CAPSULE, GELATIN COATED ORAL 2 TIMES DAILY
Status: DISCONTINUED | OUTPATIENT
Start: 2024-05-30 | End: 2024-05-28 | Stop reason: SDUPTHER

## 2024-05-28 RX ORDER — QUETIAPINE FUMARATE 25 MG/1
25 TABLET, FILM COATED ORAL 2 TIMES DAILY
Status: COMPLETED | OUTPATIENT
Start: 2024-05-28 | End: 2024-05-30

## 2024-05-28 RX ORDER — SODIUM CHLORIDE 9 MG/ML
INJECTION, SOLUTION INTRAVENOUS CONTINUOUS
Status: DISCONTINUED | OUTPATIENT
Start: 2024-05-28 | End: 2024-05-29

## 2024-05-28 RX ORDER — CHLORDIAZEPOXIDE HYDROCHLORIDE 25 MG/1
25 CAPSULE, GELATIN COATED ORAL NIGHTLY
Status: DISCONTINUED | OUTPATIENT
Start: 2024-05-31 | End: 2024-05-30 | Stop reason: HOSPADM

## 2024-05-28 RX ORDER — CHLORDIAZEPOXIDE HYDROCHLORIDE 25 MG/1
25 CAPSULE, GELATIN COATED ORAL 4 TIMES DAILY
Status: COMPLETED | OUTPATIENT
Start: 2024-05-28 | End: 2024-05-28

## 2024-05-28 RX ORDER — CHLORDIAZEPOXIDE HYDROCHLORIDE 25 MG/1
25 CAPSULE, GELATIN COATED ORAL 3 TIMES DAILY
Status: COMPLETED | OUTPATIENT
Start: 2024-05-29 | End: 2024-05-29

## 2024-05-28 RX ADMIN — QUETIAPINE FUMARATE 25 MG: 25 TABLET ORAL at 22:16

## 2024-05-28 RX ADMIN — QUETIAPINE FUMARATE 25 MG: 25 TABLET ORAL at 07:48

## 2024-05-28 RX ADMIN — SODIUM CHLORIDE, PRESERVATIVE FREE 10 ML: 5 INJECTION INTRAVENOUS at 07:49

## 2024-05-28 RX ADMIN — SODIUM CHLORIDE: 9 INJECTION, SOLUTION INTRAVENOUS at 12:07

## 2024-05-28 RX ADMIN — LISINOPRIL 5 MG: 10 TABLET ORAL at 22:16

## 2024-05-28 RX ADMIN — CHLORDIAZEPOXIDE HYDROCHLORIDE 25 MG: 25 CAPSULE ORAL at 22:16

## 2024-05-28 RX ADMIN — HYDROCORTISONE: 1 CREAM TOPICAL at 22:37

## 2024-05-28 RX ADMIN — SODIUM BICARBONATE 1300 MG: 650 TABLET ORAL at 22:16

## 2024-05-28 RX ADMIN — SODIUM CHLORIDE: 9 INJECTION, SOLUTION INTRAVENOUS at 22:21

## 2024-05-28 RX ADMIN — CHLORDIAZEPOXIDE HYDROCHLORIDE 25 MG: 25 CAPSULE ORAL at 13:05

## 2024-05-28 RX ADMIN — Medication 100 MG: at 07:47

## 2024-05-28 RX ADMIN — PANTOPRAZOLE SODIUM 40 MG: 40 INJECTION, POWDER, FOR SOLUTION INTRAVENOUS at 07:47

## 2024-05-28 RX ADMIN — APIXABAN 10 MG: 5 TABLET, FILM COATED ORAL at 07:47

## 2024-05-28 RX ADMIN — SODIUM BICARBONATE 1300 MG: 650 TABLET ORAL at 07:47

## 2024-05-28 RX ADMIN — FOLIC ACID 1 MG: 1 TABLET ORAL at 07:47

## 2024-05-28 RX ADMIN — ACETAMINOPHEN 650 MG: 325 TABLET ORAL at 17:33

## 2024-05-28 RX ADMIN — CHLORDIAZEPOXIDE HYDROCHLORIDE 25 MG: 25 CAPSULE ORAL at 17:11

## 2024-05-28 RX ADMIN — LORAZEPAM 2 MG: 2 INJECTION INTRAMUSCULAR; INTRAVENOUS at 04:12

## 2024-05-28 RX ADMIN — APIXABAN 10 MG: 5 TABLET, FILM COATED ORAL at 22:16

## 2024-05-28 RX ADMIN — CHLORDIAZEPOXIDE HYDROCHLORIDE 25 MG: 25 CAPSULE ORAL at 09:07

## 2024-05-28 RX ADMIN — HYDROCORTISONE: 1 CREAM TOPICAL at 07:48

## 2024-05-28 RX ADMIN — LISINOPRIL 5 MG: 10 TABLET ORAL at 07:47

## 2024-05-28 ASSESSMENT — PAIN DESCRIPTION - LOCATION: LOCATION: BACK

## 2024-05-28 ASSESSMENT — PAIN SCALES - GENERAL: PAINLEVEL_OUTOF10: 5

## 2024-05-28 ASSESSMENT — PAIN DESCRIPTION - ORIENTATION: ORIENTATION: LOWER

## 2024-05-28 NOTE — PROGRESS NOTES
withdrawal: On PO Phenobarbital taper, Requiring high dose Ativan. Will add Seroquel 25 Mg p.o. twice daily for agitation secondary to withdrawals check QTc, taper Seroquel to DC.Added Scheduled Librium taper.    Alcohol Abuse: On Thaimine, Folate, CIWA protocol, SW consulted he declined referrals to rehab and also did not wish to speak to peer Recovery      Hyponatremia :Will give trial of Ns and also add Sodium bicarbonate for Low Bicarb , Will monitor in am labs    Transaminitis: AST >ALT, likely 2/2 alcohol use, Trending down     DVT prophylaxis Eliquis     DISPOSITION:  Home when medically stable, Hopefully in next 24-48 hours if withdrawal symptoms are better    Medications:  REVIEWED DAILY    Infusion Medications    sodium chloride      sodium chloride      sodium chloride       Scheduled Medications    QUEtiapine  25 mg Oral BID    [START ON 5/31/2024] QUEtiapine  12.5 mg Oral BID    chlordiazePOXIDE  25 mg Oral 4x Daily    Followed by    [START ON 5/29/2024] chlordiazePOXIDE  25 mg Oral TID    Followed by    [START ON 5/30/2024] chlordiazePOXIDE  25 mg Oral BID    Followed by    [START ON 5/31/2024] chlordiazePOXIDE  25 mg Oral Nightly    sodium bicarbonate  1,300 mg Oral BID    hydrocortisone   Topical BID    pantoprazole (PROTONIX) 40 mg in sodium chloride (PF) 0.9 % 10 mL injection  40 mg IntraVENous Daily    apixaban  10 mg Oral BID    Followed by    [START ON 6/1/2024] apixaban  5 mg Oral BID    sodium chloride flush  5-40 mL IntraVENous 2 times per day    lisinopril  5 mg Oral 2 times per day    thiamine  100 mg Oral Daily    folic acid  1 mg Oral Daily    sodium chloride flush  5-40 mL IntraVENous 2 times per day     PRN Meds: benzocaine-menthol, benzonatate, calcium carbonate, hydrALAZINE, melatonin, Polyvinyl Alcohol-Povidone PF, sodium chloride, sodium chloride flush, sodium chloride, potassium chloride **OR** potassium alternative oral replacement **OR** potassium chloride, magnesium sulfate,  ondansetron **OR** ondansetron, polyethylene glycol, acetaminophen **OR** acetaminophen, sodium chloride flush, sodium chloride, LORazepam **OR** LORazepam **OR** LORazepam **OR** LORazepam **OR** LORazepam **OR** LORazepam **OR** LORazepam **OR** LORazepam    Labs:     Recent Labs     05/26/24  0603 05/27/24  0548 05/28/24  0620   WBC 5.5 8.0 8.0   HGB 13.6 14.5 14.3   HCT 39.7 41.9 41.9   * 117* 128*       Recent Labs     05/26/24  0603 05/27/24  0548 05/28/24  0620   * 134 136   K 4.1 4.0 3.7   CL 96* 98 97*   CO2 18* 16* 20*   BUN 14 12 14   CREATININE 0.8 0.7 0.8   CALCIUM 9.0 8.8 9.2       Recent Labs     05/26/24  0603 05/27/24  0548 05/28/24  0620   ALKPHOS 52 64 64   ALT 70* 76* 64*   AST 93* 93* 62*   BILITOT 0.9 1.0 0.9       No results for input(s): \"INR\" in the last 72 hours.    No results for input(s): \"CKTOTAL\", \"TROPONINI\" in the last 72 hours.    Chronic labs:    Lab Results   Component Value Date    CHOL 201 (H) 05/24/2024    TRIG 63 05/24/2024    HDL 90 05/24/2024    TSH 1.30 05/23/2024    INR 1.0 01/14/2024    LABA1C 5.2 05/24/2024       Radiology: REVIEWED DAILY    +++++++++++++++++++++++++++++++++++++++++++++++++  Vy Torres MD  [facility]   [facility] - [place], [place]  +++++++++++++++++++++++++++++++++++++++++++++++++  NOTE: This report was transcribed using voice recognition software. Every effort was made to ensure accuracy; however, inadvertent computerized transcription errors may be present.

## 2024-05-28 NOTE — CARE COORDINATION
Care Coordination  Following for discharge planning.  ETOH withdrawal on CIWA.  Discussed with attending.   Remains with restless agitation and shaky.  Librium and Seraquel added to routine.  Plan remains home when stable .  Patient is refusing additional support or ETOH  treatment.  Attending hopeful that symptoms will resolve and patient can discharge tomorrow.   Will follow.

## 2024-05-28 NOTE — PROGRESS NOTES
[facility]  [address], [address]  [place] [address]      Pulmonary Progress Note      SUBJECTIVE:  somnolent, on RA lying down in bed, not looking sob, no cough during examination, still with some agitation earlier today    OBJECTIVE    Medications    Continuous Infusions:   sodium chloride 100 mL/hr at 05/28/24 1207    sodium chloride      sodium chloride         Scheduled Meds:   QUEtiapine  25 mg Oral BID    [START ON 5/31/2024] QUEtiapine  12.5 mg Oral BID    chlordiazePOXIDE  25 mg Oral 4x Daily    Followed by    [START ON 5/29/2024] chlordiazePOXIDE  25 mg Oral TID    Followed by    [START ON 5/30/2024] chlordiazePOXIDE  25 mg Oral BID    Followed by    [START ON 5/31/2024] chlordiazePOXIDE  25 mg Oral Nightly    sodium bicarbonate  1,300 mg Oral BID    hydrocortisone   Topical BID    pantoprazole (PROTONIX) 40 mg in sodium chloride (PF) 0.9 % 10 mL injection  40 mg IntraVENous Daily    apixaban  10 mg Oral BID    Followed by    [START ON 6/1/2024] apixaban  5 mg Oral BID    sodium chloride flush  5-40 mL IntraVENous 2 times per day    lisinopril  5 mg Oral 2 times per day    thiamine  100 mg Oral Daily    folic acid  1 mg Oral Daily    sodium chloride flush  5-40 mL IntraVENous 2 times per day       PRN Meds:benzocaine-menthol, benzonatate, calcium carbonate, hydrALAZINE, melatonin, Polyvinyl Alcohol-Povidone PF, sodium chloride, sodium chloride flush, sodium chloride, potassium chloride **OR** potassium alternative oral replacement **OR** potassium chloride, magnesium sulfate, ondansetron **OR** ondansetron, polyethylene glycol, acetaminophen **OR** acetaminophen, sodium chloride flush, sodium chloride, LORazepam **OR** LORazepam **OR** LORazepam **OR** LORazepam **OR** LORazepam **OR** LORazepam **OR** LORazepam **OR** LORazepam    Physical    VITALS:  /86   Pulse 89   Temp 97.4 °F (36.3 °C) (Temporal)   Resp 18   Ht 1.753  m (5' 9\")   Wt 73.9 kg (163 lb)   SpO2 98%   BMI 24.07 kg/m²     24HR INTAKE/OUTPUT:      Intake/Output Summary (Last 24 hours) at 2024 1456  Last data filed at 2024 1414  Gross per 24 hour   Intake 1515 ml   Output 800 ml   Net 715 ml         24HR PULSE OXIMETRY RANGE:    SpO2  Av.3 %  Min: 94 %  Max: 98 %    General appearance: somnolent  Lungs: clear to auscultation bilaterally  Heart: regular rate and rhythm, S1, S2 normal, no murmur, click, rub or gallop  Abdomen: soft, non-tender; bowel sounds normal; no masses,  no organomegaly  Extremities: extremities normal, atraumatic, no cyanosis or edema  Neurologic: Mental status: somnolent    Data    CBC:   Recent Labs     24  0603 24  0548 24  0620   WBC 5.5 8.0 8.0   HGB 13.6 14.5 14.3   HCT 39.7 41.9 41.9   .8* 100.7* 102.2*   * 117* 128*         BMP:  Recent Labs     24  0603 24  0548 24  0620   * 134 136   K 4.1 4.0 3.7   CL 96* 98 97*   CO2 18* 16* 20*   BUN 14 12 14   CREATININE 0.8 0.7 0.8      ALB:3,BILIDIR:3,BILITOT:3,ALKPHOS:3)@    PT/INR: No results for input(s): \"PROTIME\", \"INR\" in the last 72 hours.    ABG:   No results for input(s): \"PH\", \"PO2\", \"PCO2\", \"HCO3\", \"BE\", \"O2SAT\", \"METHB\", \"O2HB\", \"COHB\", \"O2CON\", \"HHB\", \"THB\" in the last 72 hours.          Radiology/Other tests reviewed: LE dopplers with (+) DVT    Assessment:     Principal Problem:    Pulmonary embolism on right (HCC)  Active Problems:    Alcohol abuse    Hypertension  Resolved Problems:    * No resolved hospital problems. *      Plan:       ETOH withdrawal as per PCP  On RA and no lung medications, observe respiratory function and saturations, discussed with nurse to have pulse oximeter to watch oxygenation  Cont with DOAC, (?) place on SQ lovenox or IV heparin if unable to take po, watch tolerance due to current history of ETOH intake and increased bleeding risk      Time at the bedside, reviewing labs and

## 2024-05-29 LAB
ALBUMIN SERPL-MCNC: 3.9 G/DL (ref 3.5–5.2)
ALP SERPL-CCNC: 58 U/L (ref 40–129)
ALT SERPL-CCNC: 53 U/L (ref 0–40)
ANION GAP SERPL CALCULATED.3IONS-SCNC: 17 MMOL/L (ref 7–16)
AST SERPL-CCNC: 49 U/L (ref 0–39)
BILIRUB SERPL-MCNC: 0.7 MG/DL (ref 0–1.2)
BUN SERPL-MCNC: 13 MG/DL (ref 6–23)
CALCIUM SERPL-MCNC: 8.9 MG/DL (ref 8.6–10.2)
CHLORIDE SERPL-SCNC: 102 MMOL/L (ref 98–107)
CO2 SERPL-SCNC: 22 MMOL/L (ref 22–29)
CREAT SERPL-MCNC: 0.8 MG/DL (ref 0.7–1.2)
GFR, ESTIMATED: >90 ML/MIN/1.73M2
GLUCOSE SERPL-MCNC: 91 MG/DL (ref 74–99)
MAGNESIUM SERPL-MCNC: 1.8 MG/DL (ref 1.6–2.6)
POTASSIUM SERPL-SCNC: 3.5 MMOL/L (ref 3.5–5)
PROT SERPL-MCNC: 6.4 G/DL (ref 6.4–8.3)
SODIUM SERPL-SCNC: 141 MMOL/L (ref 132–146)

## 2024-05-29 PROCEDURE — 97530 THERAPEUTIC ACTIVITIES: CPT

## 2024-05-29 PROCEDURE — 2580000003 HC RX 258: Performed by: INTERNAL MEDICINE

## 2024-05-29 PROCEDURE — 2580000003 HC RX 258: Performed by: STUDENT IN AN ORGANIZED HEALTH CARE EDUCATION/TRAINING PROGRAM

## 2024-05-29 PROCEDURE — C9113 INJ PANTOPRAZOLE SODIUM, VIA: HCPCS | Performed by: STUDENT IN AN ORGANIZED HEALTH CARE EDUCATION/TRAINING PROGRAM

## 2024-05-29 PROCEDURE — 36415 COLL VENOUS BLD VENIPUNCTURE: CPT

## 2024-05-29 PROCEDURE — 99222 1ST HOSP IP/OBS MODERATE 55: CPT | Performed by: FAMILY MEDICINE

## 2024-05-29 PROCEDURE — 6370000000 HC RX 637 (ALT 250 FOR IP): Performed by: STUDENT IN AN ORGANIZED HEALTH CARE EDUCATION/TRAINING PROGRAM

## 2024-05-29 PROCEDURE — 6370000000 HC RX 637 (ALT 250 FOR IP): Performed by: INTERNAL MEDICINE

## 2024-05-29 PROCEDURE — A4216 STERILE WATER/SALINE, 10 ML: HCPCS | Performed by: STUDENT IN AN ORGANIZED HEALTH CARE EDUCATION/TRAINING PROGRAM

## 2024-05-29 PROCEDURE — 2060000000 HC ICU INTERMEDIATE R&B

## 2024-05-29 PROCEDURE — 83735 ASSAY OF MAGNESIUM: CPT

## 2024-05-29 PROCEDURE — 80053 COMPREHEN METABOLIC PANEL: CPT

## 2024-05-29 PROCEDURE — 97161 PT EVAL LOW COMPLEX 20 MIN: CPT

## 2024-05-29 PROCEDURE — 6360000002 HC RX W HCPCS: Performed by: STUDENT IN AN ORGANIZED HEALTH CARE EDUCATION/TRAINING PROGRAM

## 2024-05-29 RX ADMIN — SODIUM CHLORIDE, PRESERVATIVE FREE 10 ML: 5 INJECTION INTRAVENOUS at 21:40

## 2024-05-29 RX ADMIN — PANTOPRAZOLE SODIUM 40 MG: 40 INJECTION, POWDER, FOR SOLUTION INTRAVENOUS at 08:01

## 2024-05-29 RX ADMIN — HYDROCORTISONE: 1 CREAM TOPICAL at 21:41

## 2024-05-29 RX ADMIN — Medication 100 MG: at 08:02

## 2024-05-29 RX ADMIN — CHLORDIAZEPOXIDE HYDROCHLORIDE 25 MG: 25 CAPSULE ORAL at 21:40

## 2024-05-29 RX ADMIN — QUETIAPINE FUMARATE 25 MG: 25 TABLET ORAL at 21:41

## 2024-05-29 RX ADMIN — LISINOPRIL 5 MG: 10 TABLET ORAL at 21:40

## 2024-05-29 RX ADMIN — LISINOPRIL 5 MG: 10 TABLET ORAL at 08:02

## 2024-05-29 RX ADMIN — SODIUM BICARBONATE 1300 MG: 650 TABLET ORAL at 21:40

## 2024-05-29 RX ADMIN — SODIUM BICARBONATE 1300 MG: 650 TABLET ORAL at 08:01

## 2024-05-29 RX ADMIN — CHLORDIAZEPOXIDE HYDROCHLORIDE 25 MG: 25 CAPSULE ORAL at 08:01

## 2024-05-29 RX ADMIN — CHLORDIAZEPOXIDE HYDROCHLORIDE 25 MG: 25 CAPSULE ORAL at 13:11

## 2024-05-29 RX ADMIN — FOLIC ACID 1 MG: 1 TABLET ORAL at 08:01

## 2024-05-29 RX ADMIN — QUETIAPINE FUMARATE 25 MG: 25 TABLET ORAL at 08:01

## 2024-05-29 RX ADMIN — APIXABAN 10 MG: 5 TABLET, FILM COATED ORAL at 08:01

## 2024-05-29 RX ADMIN — APIXABAN 10 MG: 5 TABLET, FILM COATED ORAL at 21:40

## 2024-05-29 RX ADMIN — HYDROCORTISONE: 1 CREAM TOPICAL at 08:02

## 2024-05-29 RX ADMIN — SODIUM CHLORIDE, PRESERVATIVE FREE 10 ML: 5 INJECTION INTRAVENOUS at 21:45

## 2024-05-29 ASSESSMENT — PAIN SCALES - GENERAL
PAINLEVEL_OUTOF10: 0
PAINLEVEL_OUTOF10: 0

## 2024-05-29 NOTE — PROGRESS NOTES
Hospitalist Progress Note      SYNOPSIS: Patient admitted on 2024 for Pulmonary embolism on right (HCC)      SUBJECTIVE:    Patient seen and examined.  Questions appropriately.  Not confused.  Family at bedside.  All questions and concerns addressed.  Discussed Eliquis transition outpatient follow-up with PCP for hypercoagulable workup.  Records reviewed.     63-year-old male past medical history of alcohol dependence, coronary disease, hypertension presented to diaphoresis lightheadedness and shakiness found to be in alcohol withdrawal also CTA chest showed right lung PE started on heparin by weight.  Seen by pulmonology transition to [facility].  Patient required alcohol withdrawal protocol due to concern for alcohol withdrawal with mental status change.  This is improved so far assessment status has been back to baseline.  He did not require oxygen during hospital stay.  Doppler extremities showed bilateral DVTs.    Stable overnight. No other overnight issues reported.   Temp (24hrs), Av °F (36.7 °C), Min:97.2 °F (36.2 °C), Max:98.7 °F (37.1 °C)    DIET: ADULT DIET; Regular  CODE: Full Code    Intake/Output Summary (Last 24 hours) at 2024 1203  Last data filed at 2024 1124  Gross per 24 hour   Intake 1551 ml   Output 2100 ml   Net -549 ml       OBJECTIVE:    /84   Pulse 80   Temp 97.8 °F (36.6 °C) (Temporal)   Resp 19   Ht 1.753 m (5' 9\")   Wt 73.9 kg (163 lb)   SpO2 99%   BMI 24.07 kg/m²     General appearance: No apparent distress, appears stated age and cooperative.  HEENT:  Conjunctivae/corneas clear.   Neck: Supple. No jugular venous distention.   Respiratory: Clear to auscultation bilaterally, normal respiratory effort  Cardiovascular: Regular rate rhythm, normal S1-S2  Abdomen: Soft, nontender, nondistended  Musculoskeletal: No clubbing, cyanosis, no bilateral lower extremity edema. Brisk capillary refill.   Skin:  No rashes  on visible skin  Neurologic: awake, alert and  following commands     ASSESSMENT:  Right lung pulmonary embolism  Bilateral lower extremity DVT  Alcohol dependence with withdrawal       PLAN:  Patient is stable from alcohol withdrawal standpoint answer questions appropriately no tremors.  Tolerating oral intake.  Continue Eliquis 10 mg twice daily.  Will benefit from hypercoagulable workup outpatient.  Awaiting PT evaluation for discharge planning as patient has not been out of bed for the last 3 days.      DISPOSITION:     Medications:  REVIEWED DAILY    Infusion Medications    sodium chloride      sodium chloride       Scheduled Medications    QUEtiapine  25 mg Oral BID    [START ON 5/31/2024] QUEtiapine  12.5 mg Oral BID    chlordiazePOXIDE  25 mg Oral TID    Followed by    [START ON 5/30/2024] chlordiazePOXIDE  25 mg Oral BID    Followed by    [START ON 5/31/2024] chlordiazePOXIDE  25 mg Oral Nightly    sodium bicarbonate  1,300 mg Oral BID    hydrocortisone   Topical BID    pantoprazole (PROTONIX) 40 mg in sodium chloride (PF) 0.9 % 10 mL injection  40 mg IntraVENous Daily    apixaban  10 mg Oral BID    Followed by    [START ON 6/1/2024] apixaban  5 mg Oral BID    sodium chloride flush  5-40 mL IntraVENous 2 times per day    lisinopril  5 mg Oral 2 times per day    thiamine  100 mg Oral Daily    folic acid  1 mg Oral Daily    sodium chloride flush  5-40 mL IntraVENous 2 times per day     PRN Meds: benzocaine-menthol, benzonatate, calcium carbonate, hydrALAZINE, melatonin, Polyvinyl Alcohol-Povidone PF, sodium chloride, sodium chloride flush, sodium chloride, potassium chloride **OR** potassium alternative oral replacement **OR** potassium chloride, magnesium sulfate, ondansetron **OR** ondansetron, polyethylene glycol, acetaminophen **OR** acetaminophen, sodium chloride flush, sodium chloride, LORazepam **OR** LORazepam **OR** LORazepam **OR** LORazepam **OR** LORazepam **OR** LORazepam **OR** LORazepam **OR** LORazepam    Labs:     Recent Labs

## 2024-05-29 NOTE — PROGRESS NOTES
Physical Therapy  Physical Therapy Initial Assessment    Name: Juan Francisco Jaquez  : 1960  MRN: [id number]      Date of Service: 2024    Evaluating PT:  Kalia Gallegos, PT, DPT [id number]    Room #:  7424/7424-A  Diagnosis:  Lightheadedness [R42]  Alcohol abuse [F10.10]  Pulmonary embolism on right (HCC) [I26.99]  Right pulmonary embolus (HCC) [I26.99]  PMHx/PSHx:   has a past medical history of Chronic venous insufficiency, History of DVT (deep vein thrombosis), Hx of blood clots, Inguinal hernia, Screening for colon cancer, and Varicose veins of lower extremities.   has a past surgical history that includes hernia repair (Bilateral, 2013); transesophageal echocardiogram (2022); Cardiac catheterization (2022); Mitral valve repair (2022); Colonoscopy; and Colonoscopy (N/A, 2024).  Procedure/Surgery:  None  Precautions:  Falls, seizure precautions, TSM  Equipment Needs:  TBD    SUBJECTIVE:    Pt lives alone in a 3 story home + basement with 0 stair(s) to enter.  Bed is on 3rd floor and bath is on 2nd floor.  Full flight of stairs with 1 rail between levels within home.  Pt ambulated with no AD prior to admission.    OBJECTIVE:   Initial Evaluation  Date: 2024 Treatment Short Term/ Long Term   Goals   AM-PAC 6 Clicks      Was pt agreeable to Eval/treatment? Yes     Does pt have pain? No     Bed Mobility  Rolling: NT  Supine to sit: SBA  Sit to supine: NT  Scooting: SBA (seated)  Rolling: Independent  Supine to sit: Independent  Sit to supine: Independent  Scooting: Independent   Transfers Sit to stand: Ernesto (bed); ModA (commode)  Stand to sit: Ernesto  Stand pivot: NT  Sit to stand: Mod I  Stand to sit: Mod I  Stand pivot: Mod I with AAD   Ambulation    20', 25' with WW ModA  150 feet with AAD Mod I   Stair negotiation: ascended and descended  NT  12 step(s) with 1 rail(s) Mod I   ROM BUE:  See OT note  BLE:  WFL     Strength BUE:  See OT note  BLE:  WFL     Balance Sitting EOB:   SBA  Dynamic Standing:  ModA with WW  Sitting EOB:  Independent  Dynamic Standing:  Mod I with AAD     Pt is A & O x 4  Sensation:  Pt denies numbness/tingling to extremities  Edema:  Unremarkable    Vitals:   , SpO2 98% at rest.  , SpO2 98% with activity.    Patient education  Pt educated on role of PT, safety during functional mobility, use of call light for assistance.    Patient response to education:   Pt expressed understanding Pt demonstrated skill Pt requires further education in this area   Yes Yes Yes     ASSESSMENT:    Conditions Requiring Skilled Therapeutic Intervention:    [x]Decreased strength     []Decreased ROM  [x]Decreased functional mobility  [x]Decreased balance   []Decreased endurance   []Decreased posture  []Decreased sensation  []Decreased coordination   []Decreased vision  [x]Decreased safety awareness   []Increased pain       Comments:  Session cleared by nurse. Patient in bed upon arrival; agreeable to PT session. Ambulated to/from bathroom with decreased speed and unsteadiness. Denied dizziness throughout session. Per nurse, OK to leave patient sitting in chair. Patient left sitting in chair with alarm activated, call light in reach. Instructed not to get up on own and to use call light for assistance; expressed understanding. Patient would benefit from continued skilled PT services to address functional deficits and prevent deconditioning.    Treatment:  Patient practiced and was instructed in the following treatment:    Bed mobility - physical assistance provided as needed during activity  Functional transfers - verbal cues to facilitate proper positioning and sequencing, particularly related to hand/foot placement; physical assistance provided as needed during activity  Ambulation - verbal cues to facilitate proper walker approximation; physical assistance provided as needed during activity  Skilled monitoring of vitals    Pt's/ family goals   1. None stated    Prognosis

## 2024-05-29 NOTE — CARE COORDINATION
5/29. On rm air SpO2 99%. On Librium tid now from qid. IVF continued. Placed on eliquis 10mg bid. Plan is home when medically stable. Kinza Wei RN    8218- Met with the pt. He feels that he will benefit from rehab. He would like a facility close to his home. Choice 1. [facility] 2. [facility]. Referral made to Desire[facility]. PASRR/envelope completed. Kinza Wei RN

## 2024-05-29 NOTE — PROGRESS NOTES
[facility]  [address], [address]  [place] [address]      Pulmonary Progress Note      SUBJECTIVE:  arousable, conversant, oriented, on RA lying down in bed, not looking sob, no cough during examination, not looking as tremulous    OBJECTIVE    Medications    Continuous Infusions:   sodium chloride      sodium chloride         Scheduled Meds:   QUEtiapine  25 mg Oral BID    [START ON 5/31/2024] QUEtiapine  12.5 mg Oral BID    chlordiazePOXIDE  25 mg Oral TID    Followed by    [START ON 5/30/2024] chlordiazePOXIDE  25 mg Oral BID    Followed by    [START ON 5/31/2024] chlordiazePOXIDE  25 mg Oral Nightly    sodium bicarbonate  1,300 mg Oral BID    hydrocortisone   Topical BID    pantoprazole (PROTONIX) 40 mg in sodium chloride (PF) 0.9 % 10 mL injection  40 mg IntraVENous Daily    apixaban  10 mg Oral BID    Followed by    [START ON 6/1/2024] apixaban  5 mg Oral BID    sodium chloride flush  5-40 mL IntraVENous 2 times per day    lisinopril  5 mg Oral 2 times per day    thiamine  100 mg Oral Daily    folic acid  1 mg Oral Daily    sodium chloride flush  5-40 mL IntraVENous 2 times per day       PRN Meds:benzocaine-menthol, benzonatate, calcium carbonate, hydrALAZINE, melatonin, Polyvinyl Alcohol-Povidone PF, sodium chloride, sodium chloride flush, sodium chloride, potassium chloride **OR** potassium alternative oral replacement **OR** potassium chloride, magnesium sulfate, ondansetron **OR** ondansetron, polyethylene glycol, acetaminophen **OR** acetaminophen, sodium chloride flush, sodium chloride, LORazepam **OR** LORazepam **OR** LORazepam **OR** LORazepam **OR** LORazepam **OR** LORazepam **OR** LORazepam **OR** LORazepam    Physical    VITALS:  /76   Pulse (!) 101   Temp 97.3 °F (36.3 °C) (Temporal)   Resp 18   Ht 1.753 m (5' 9\")   Wt 73.9 kg (163 lb)   SpO2 97%   BMI 24.07 kg/m²     24HR INTAKE/OUTPUT:      Intake/Output  Summary (Last 24 hours) at 2024 1436  Last data filed at 2024 1124  Gross per 24 hour   Intake 1311 ml   Output 2100 ml   Net -789 ml         24HR PULSE OXIMETRY RANGE:    SpO2  Av.2 %  Min: 96 %  Max: 99 %    General appearance: cooperative  Lungs: clear to auscultation bilaterally  Heart: regular rate and rhythm, S1, S2 normal, no murmur, click, rub or gallop  Abdomen: soft, non-tender; bowel sounds normal; no masses,  no organomegaly  Extremities: extremities normal, atraumatic, no cyanosis or edema  Neurologic: Mental status: arousable, conversant, oriented, moving extremities    Data    CBC:   Recent Labs     24  0548 24  0620   WBC 8.0 8.0   HGB 14.5 14.3   HCT 41.9 41.9   .7* 102.2*   * 128*         BMP:  Recent Labs     24  0548 24  0620 24  0552    136 141   K 4.0 3.7 3.5   CL 98 97* 102   CO2 16* 20* 22   BUN 12 14 13   CREATININE 0.7 0.8 0.8      ALB:3,BILIDIR:3,BILITOT:3,ALKPHOS:3)@    PT/INR: No results for input(s): \"PROTIME\", \"INR\" in the last 72 hours.    ABG:   No results for input(s): \"PH\", \"PO2\", \"PCO2\", \"HCO3\", \"BE\", \"O2SAT\", \"METHB\", \"O2HB\", \"COHB\", \"O2CON\", \"HHB\", \"THB\" in the last 72 hours.          Radiology/Other tests reviewed: LE dopplers with (+) DVT    Assessment:     Principal Problem:    Pulmonary embolism on right (HCC)  Active Problems:    Alcohol abuse    Hypertension  Resolved Problems:    * No resolved hospital problems. *      Plan:       ETOH withdrawal as per PCP  On RA and no lung medications, observe respiratory function and saturations  Cont with DOAC, watch tolerance due to current history of ETOH intake and increased bleeding risk  OOB to chair with assistance, PT/OT      Time at the bedside, reviewing labs and radiographs, reviewing notes and consultations, discussing with staff and family was more than 50 minutes.      Thanks for letting us see this patient in consultation.  Please contact us with any

## 2024-05-30 VITALS
TEMPERATURE: 97.3 F | OXYGEN SATURATION: 97 % | HEIGHT: 69 IN | DIASTOLIC BLOOD PRESSURE: 66 MMHG | BODY MASS INDEX: 24.14 KG/M2 | WEIGHT: 163 LBS | SYSTOLIC BLOOD PRESSURE: 99 MMHG | RESPIRATION RATE: 19 BRPM | HEART RATE: 85 BPM

## 2024-05-30 LAB
ALBUMIN SERPL-MCNC: 3.8 G/DL (ref 3.5–5.2)
ALP SERPL-CCNC: 56 U/L (ref 40–129)
ALT SERPL-CCNC: 53 U/L (ref 0–40)
ANION GAP SERPL CALCULATED.3IONS-SCNC: 12 MMOL/L (ref 7–16)
AST SERPL-CCNC: 45 U/L (ref 0–39)
BILIRUB SERPL-MCNC: 0.5 MG/DL (ref 0–1.2)
BUN SERPL-MCNC: 9 MG/DL (ref 6–23)
CALCIUM SERPL-MCNC: 9.1 MG/DL (ref 8.6–10.2)
CHLORIDE SERPL-SCNC: 101 MMOL/L (ref 98–107)
CO2 SERPL-SCNC: 26 MMOL/L (ref 22–29)
CREAT SERPL-MCNC: 0.9 MG/DL (ref 0.7–1.2)
EKG ATRIAL RATE: 86 BPM
EKG P AXIS: 48 DEGREES
EKG P-R INTERVAL: 190 MS
EKG Q-T INTERVAL: 388 MS
EKG QRS DURATION: 102 MS
EKG QTC CALCULATION (BAZETT): 464 MS
EKG R AXIS: -53 DEGREES
EKG T AXIS: 1 DEGREES
EKG VENTRICULAR RATE: 86 BPM
GFR, ESTIMATED: >90 ML/MIN/1.73M2
GLUCOSE SERPL-MCNC: 94 MG/DL (ref 74–99)
POTASSIUM SERPL-SCNC: 3.6 MMOL/L (ref 3.5–5)
PROT SERPL-MCNC: 6.4 G/DL (ref 6.4–8.3)
SODIUM SERPL-SCNC: 139 MMOL/L (ref 132–146)

## 2024-05-30 PROCEDURE — 97165 OT EVAL LOW COMPLEX 30 MIN: CPT

## 2024-05-30 PROCEDURE — 93005 ELECTROCARDIOGRAM TRACING: CPT | Performed by: STUDENT IN AN ORGANIZED HEALTH CARE EDUCATION/TRAINING PROGRAM

## 2024-05-30 PROCEDURE — 97535 SELF CARE MNGMENT TRAINING: CPT

## 2024-05-30 PROCEDURE — 6370000000 HC RX 637 (ALT 250 FOR IP): Performed by: INTERNAL MEDICINE

## 2024-05-30 PROCEDURE — 6370000000 HC RX 637 (ALT 250 FOR IP): Performed by: STUDENT IN AN ORGANIZED HEALTH CARE EDUCATION/TRAINING PROGRAM

## 2024-05-30 PROCEDURE — 99239 HOSP IP/OBS DSCHRG MGMT >30: CPT | Performed by: INTERNAL MEDICINE

## 2024-05-30 PROCEDURE — A4216 STERILE WATER/SALINE, 10 ML: HCPCS | Performed by: STUDENT IN AN ORGANIZED HEALTH CARE EDUCATION/TRAINING PROGRAM

## 2024-05-30 PROCEDURE — 6360000002 HC RX W HCPCS: Performed by: STUDENT IN AN ORGANIZED HEALTH CARE EDUCATION/TRAINING PROGRAM

## 2024-05-30 PROCEDURE — 93010 ELECTROCARDIOGRAM REPORT: CPT | Performed by: INTERNAL MEDICINE

## 2024-05-30 PROCEDURE — C9113 INJ PANTOPRAZOLE SODIUM, VIA: HCPCS | Performed by: STUDENT IN AN ORGANIZED HEALTH CARE EDUCATION/TRAINING PROGRAM

## 2024-05-30 PROCEDURE — 2580000003 HC RX 258: Performed by: STUDENT IN AN ORGANIZED HEALTH CARE EDUCATION/TRAINING PROGRAM

## 2024-05-30 PROCEDURE — 97530 THERAPEUTIC ACTIVITIES: CPT

## 2024-05-30 PROCEDURE — 36415 COLL VENOUS BLD VENIPUNCTURE: CPT

## 2024-05-30 PROCEDURE — 80053 COMPREHEN METABOLIC PANEL: CPT

## 2024-05-30 PROCEDURE — 2580000003 HC RX 258: Performed by: INTERNAL MEDICINE

## 2024-05-30 RX ORDER — FOLIC ACID 1 MG/1
1 TABLET ORAL DAILY
Qty: 30 TABLET | Refills: 3 | Status: SHIPPED | OUTPATIENT
Start: 2024-05-31

## 2024-05-30 RX ADMIN — HYDROCORTISONE: 1 CREAM TOPICAL at 18:33

## 2024-05-30 RX ADMIN — PANTOPRAZOLE SODIUM 40 MG: 40 INJECTION, POWDER, FOR SOLUTION INTRAVENOUS at 09:05

## 2024-05-30 RX ADMIN — SODIUM CHLORIDE, PRESERVATIVE FREE 10 ML: 5 INJECTION INTRAVENOUS at 09:10

## 2024-05-30 RX ADMIN — APIXABAN 10 MG: 5 TABLET, FILM COATED ORAL at 09:06

## 2024-05-30 RX ADMIN — LISINOPRIL 5 MG: 10 TABLET ORAL at 09:06

## 2024-05-30 RX ADMIN — CHLORDIAZEPOXIDE HYDROCHLORIDE 25 MG: 25 CAPSULE ORAL at 09:10

## 2024-05-30 RX ADMIN — Medication 100 MG: at 09:06

## 2024-05-30 RX ADMIN — QUETIAPINE FUMARATE 25 MG: 25 TABLET ORAL at 09:06

## 2024-05-30 RX ADMIN — SODIUM BICARBONATE 1300 MG: 650 TABLET ORAL at 09:05

## 2024-05-30 RX ADMIN — FOLIC ACID 1 MG: 1 TABLET ORAL at 09:05

## 2024-05-30 ASSESSMENT — PAIN SCALES - GENERAL
PAINLEVEL_OUTOF10: 0
PAINLEVEL_OUTOF10: 0

## 2024-05-30 NOTE — CARE COORDINATION
5/30. Approval received for [facility]. The facility does not have any transportation available for the remainder of the day. Instructed by liaison to call [facility]. They do not have any availability today either. Placed call to pt's brother, Sridhar. He is out of town right now, but he is going to make some phone calls to try to get a ride for this pt. Asked him to call nurse's station with the above information. Kinza Wei RN    5/21-received call back from brother, Sridhar. Mona Oliverampalana will be here to  [facility] She will call nurse's station with the time. Kinza Wei RN

## 2024-05-30 NOTE — DISCHARGE SUMMARY
[facility] Hospitalist Physician Discharge Summary       [facility]  [address] [address]  [phone]        Avni Leung,   [address] [address]  [phone]    Schedule an appointment as soon as possible for a visit  Hospital followup    Jeyson Skinner MD  [address]  [address]  [facility] [address]  [phone]    Schedule an appointment as soon as possible for a visit in 1 month(s)  Hospital followup      Activity level: As tolerated     Dispo: Rehab    Condition on discharge: Stable     Patient ID:  Juan Francisco Jaquez  [id number]  63 y.o.  1960    Admit date: 5/23/2024    Discharge date and time:  5/30/2024  3:24 PM    Admission Diagnoses: Principal Problem:    Pulmonary embolism on right (HCC)  Active Problems:    Alcohol abuse    Hypertension  Resolved Problems:    * No resolved hospital problems. *      Discharge Diagnoses: Principal Problem:    Pulmonary embolism on right (HCC)  Active Problems:    Alcohol abuse    Hypertension  Resolved Problems:    * No resolved hospital problems. *      Consults:  IP CONSULT TO INTERNAL MEDICINE  IP CONSULT TO SOCIAL WORK  IP CONSULT TO PULMONOLOGY    Hospital Course:   Patient Juan Francisco Jaquez is a 63 y.o. presented with Lightheadedness [R42]  Alcohol abuse [F10.10]  Pulmonary embolism on right (HCC) [I26.99]  Right pulmonary embolus (HCC) [I26.99]    63-year-old male past medical history of alcohol dependence, coronary disease, hypertension presented to diaphoresis lightheadedness and shakiness found to be in alcohol withdrawal also CTA chest showed right lung PE started on heparin by weight.  Seen by pulmonology transition to [facility].  Patient required alcohol withdrawal protocol due to concern for alcohol withdrawal with mental status change.  This is improved so far assessment status has been back to baseline.  He did not require oxygen during hospital stay.  Doppler  Automated exposure control, iterative reconstruction, and/or weight based adjustment of the mA/kV was utilized to reduce the radiation dose to as low as reasonably achievable. COMPARISON: None. HISTORY: ORDERING SYSTEM PROVIDED HISTORY: eval for pe TECHNOLOGIST PROVIDED HISTORY: Reason for exam:->eval for pe Additional Contrast?->1 What reading provider will be dictating this exam?->CRC FINDINGS: Pulmonary Arteries: Pulmonary arteries are adequately opacified for evaluation.  Partially occlusive thrombus involving pulmonary artery branch to the posterior right lower lobe..  Main pulmonary artery is normal in caliber. Mediastinum: No evidence of mediastinal lymphadenopathy.  The heart and pericardium demonstrate no acute abnormality.  There is no acute abnormality of the thoracic aorta. Lungs/pleura: The lungs are without acute process.  No focal consolidation or pulmonary edema.  No evidence of pleural effusion or pneumothorax. Upper Abdomen: Limited images of the upper abdomen are unremarkable.  Back steatosis. Soft Tissues/Bones: No acute bone or soft tissue abnormality.     1. Partially occlusive thrombus involving pulmonary artery branch to the posterior right lower lobe. 2. Hepatic steatosis.     XR CHEST (2 VW)    Result Date: 5/23/2024  EXAMINATION: TWO XRAY VIEWS OF THE CHEST 5/23/2024 10:08 pm COMPARISON: January 14, 2024 HISTORY: ORDERING SYSTEM PROVIDED HISTORY: fatigue TECHNOLOGIST PROVIDED HISTORY: Reason for exam:->fatigue FINDINGS: Patient had previous mitral valvuloplasty.  Heart is normal size.  There is discrete ectasia of the thoracic aorta. No acute infiltrates, consolidations or pleural effusions are seen.  Lungs are normally expanded. There is no perihilar vascular congestion. There is no pneumothorax.     No acute cardiopulmonary process.       Patient Instructions:      Medication List        START taking these medications      * apixaban 5 MG Tabs tablet  Commonly known as: ELIQUIS  Take 2

## 2024-05-30 NOTE — DISCHARGE INSTR - COC
Continuity of Care Form    Patient Name: Juan Francisco Purcell   :  1960  MRN:  [id number]    Admit date:  2024  Discharge date:  24    Code Status Order: Full Code   Advance Directives:     Admitting Physician:  Little Duenas DO  PCP: Avni Leung DO    Discharging Nurse: Maria Esther Feng RN    Discharging Hospital Unit/Room#: 7424/7424-A  Discharging Unit Phone Number: [phone]    Emergency Contact:   Extended Emergency Contact Information  Primary Emergency Contact: makenzie purcell  Mobile Phone: [phone]  Relation: Brother/Sister  Secondary Emergency Contact: octaviano purcell  Mobile Phone: [phone]  Relation: Brother/Sister  Preferred language: English   needed? No    Past Surgical History:  Past Surgical History:   Procedure Laterality Date    CARDIAC CATHETERIZATION  2022    Dr hernandez    COLONOSCOPY      COLONOSCOPY N/A 2024    COLORECTAL CANCER SCREENING, NOT HIGH RISK performed by Umer Rudolph MD at [facility] ENDOSCOPY    HERNIA REPAIR Bilateral 2013    Inguinal    MITRAL VALVE REPAIR  2022    robotic mini mitral valvoplasty [facility]    TRANSESOPHAGEAL ECHOCARDIOGRAM  2022       Immunization History:   Immunization History   Administered Date(s) Administered    COVID-19, PFIZER GRAY top, DO NOT Dilute, (age 12 y+), IM, 30 mcg/0.3 mL 2022    COVID-19, PFIZER PURPLE top, DILUTE for use, (age 12 y+), 30mcg/0.3mL 2021       Active Problems:  Patient Active Problem List   Diagnosis Code    History of DVT (deep vein thrombosis) Z86.718    Chronic venous insufficiency I87.2    Varicose veins of both lower extremities I83.93    Chest pain R07.9    Alcohol abuse F10.10    Nonrheumatic mitral valve regurgitation I34.0    Pulmonary embolism on right (HCC) I26.99    Hypertension I10       Isolation/Infection:   Isolation            No Isolation          Patient Infection Status       None to display                     Nurse Assessment:  Last Vital Signs: BP  ***    Readmission Risk Assessment Score:  Readmission Risk              Risk of Unplanned Readmission:  18           Discharging to Facility/ Agency   Name: [facility]  Address:  Phone:  Fax:    Dialysis Facility (if applicable)   Name:  Address:  Dialysis Schedule:  Phone:  Fax:    / signature: Electronically signed by Kinza Wei RN on 5/30/24 at 8:33 AM EDT    PHYSICIAN SECTION    Prognosis: Good    Condition at Discharge: Stable    Rehab Potential (if transferring to Rehab): Good    Recommended Labs or Other Treatments After Discharge: ***    Physician Certification: I certify the above information and transfer of Juan Francisco Jaquez  is necessary for the continuing treatment of the diagnosis listed and that he requires Skilled Nursing Facility for less 30 days.     Update Admission H&P: {CHP DME Changes in HandP:[id number]}    PHYSICIAN SIGNATURE:  {Esignature:[id number]}

## 2024-05-30 NOTE — PROGRESS NOTES
[facility]  [address], [address]  [place] [address]      Pulmonary Progress Note      SUBJECTIVE:  awake, on RA sitting up in chair, not looking sob, no cough during examination, claims did ok ambulating in hallway with PT    OBJECTIVE    Medications    Continuous Infusions:   sodium chloride      sodium chloride         Scheduled Meds:   [START ON 5/31/2024] QUEtiapine  12.5 mg Oral BID    chlordiazePOXIDE  25 mg Oral BID    Followed by    [START ON 5/31/2024] chlordiazePOXIDE  25 mg Oral Nightly    hydrocortisone   Topical BID    pantoprazole (PROTONIX) 40 mg in sodium chloride (PF) 0.9 % 10 mL injection  40 mg IntraVENous Daily    apixaban  10 mg Oral BID    Followed by    [START ON 6/1/2024] apixaban  5 mg Oral BID    sodium chloride flush  5-40 mL IntraVENous 2 times per day    lisinopril  5 mg Oral 2 times per day    thiamine  100 mg Oral Daily    folic acid  1 mg Oral Daily    sodium chloride flush  5-40 mL IntraVENous 2 times per day       PRN Meds:benzocaine-menthol, benzonatate, calcium carbonate, hydrALAZINE, melatonin, Polyvinyl Alcohol-Povidone PF, sodium chloride, sodium chloride flush, sodium chloride, potassium chloride **OR** potassium alternative oral replacement **OR** potassium chloride, magnesium sulfate, ondansetron **OR** ondansetron, polyethylene glycol, acetaminophen **OR** acetaminophen, sodium chloride flush, sodium chloride, LORazepam **OR** LORazepam **OR** LORazepam **OR** LORazepam **OR** LORazepam **OR** LORazepam **OR** LORazepam **OR** LORazepam    Physical    VITALS:  /86   Pulse (!) 113   Temp 97.8 °F (36.6 °C)   Resp 20   Ht 1.753 m (5' 9\")   Wt 73.9 kg (163 lb)   SpO2 97%   BMI 24.07 kg/m²     24HR INTAKE/OUTPUT:      Intake/Output Summary (Last 24 hours) at 5/30/2024 1407  Last data filed at 5/30/2024 1044  Gross per 24 hour   Intake 810 ml   Output 1675 ml   Net -865 ml         24HR PULSE

## 2024-05-30 NOTE — PLAN OF CARE
Problem: Discharge Planning  Goal: Discharge to home or other facility with appropriate resources  Outcome: Progressing     Problem: Pain  Goal: Verbalizes/displays adequate comfort level or baseline comfort level  5/30/2024 0312 by Neeraj Lantigua RN  Outcome: Progressing     Problem: Safety - Adult  Goal: Free from fall injury  5/30/2024 0312 by Neeraj Lantigua, RN  Outcome: Progressing     Problem: Skin/Tissue Integrity  Goal: Absence of new skin breakdown  Description: 1.  Monitor for areas of redness and/or skin breakdown  2.  Assess vascular access sites hourly  3.  Every 4-6 hours minimum:  Change oxygen saturation probe site  4.  Every 4-6 hours:  If on nasal continuous positive airway pressure, respiratory therapy assess nares and determine need for appliance change or resting period.  5/30/2024 0312 by Neeraj Lantigua, RN  Outcome: Progressing

## 2024-05-30 NOTE — PROGRESS NOTES
In: 10:08a  Time Out: 10:31a  Total Treatment Time: 8 minutes    Min Units   OT Eval Low 41124  x     OT Eval Medium 22070      OT Eval High 21959      OT Re-Eval 79019       Therapeutic Ex 61308       Therapeutic Activities 10209       ADL/Self Care 83953  8 1    Orthotic Management 43188       Manual 05656     Neuro Re-Ed 01682       Non-Billable Time          Evaluation Time additionally includes thorough review of current medical information, gathering information on past medical history/social history and prior level of function, interpretation of standardized testing/informal observation of tasks, assessment of data and development of plan of care and goals.            Ifrah Ordonez OTR/L; [id number]

## 2024-06-06 ENCOUNTER — TELEPHONE (OUTPATIENT)
Dept: ADMINISTRATIVE | Age: 64
End: 2024-06-06

## 2024-06-06 NOTE — TELEPHONE ENCOUNTER
Patient was in the hospital for a pulmonary embolism and needs to see Dr. Peter next week. He is going to be discharged from [facility] on Saturday, June 8th. He is needing an appt for next week if possible.

## 2024-07-18 RX ORDER — LISINOPRIL 5 MG/1
5 TABLET ORAL EVERY 12 HOURS SCHEDULED
Qty: 60 TABLET | Refills: 5 | Status: SHIPPED | OUTPATIENT
Start: 2024-07-18

## 2024-07-26 ENCOUNTER — OFFICE VISIT (OUTPATIENT)
Dept: CARDIOLOGY CLINIC | Age: 64
End: 2024-07-26
Payer: COMMERCIAL

## 2024-07-26 VITALS
DIASTOLIC BLOOD PRESSURE: 80 MMHG | WEIGHT: 175 LBS | HEIGHT: 69 IN | RESPIRATION RATE: 16 BRPM | HEART RATE: 69 BPM | BODY MASS INDEX: 25.92 KG/M2 | SYSTOLIC BLOOD PRESSURE: 144 MMHG

## 2024-07-26 DIAGNOSIS — I34.0 NONRHEUMATIC MITRAL VALVE REGURGITATION: Primary | ICD-10-CM

## 2024-07-26 PROCEDURE — 3077F SYST BP >= 140 MM HG: CPT | Performed by: INTERNAL MEDICINE

## 2024-07-26 PROCEDURE — 99213 OFFICE O/P EST LOW 20 MIN: CPT | Performed by: INTERNAL MEDICINE

## 2024-07-26 PROCEDURE — 93000 ELECTROCARDIOGRAM COMPLETE: CPT | Performed by: INTERNAL MEDICINE

## 2024-07-26 PROCEDURE — 3079F DIAST BP 80-89 MM HG: CPT | Performed by: INTERNAL MEDICINE

## 2024-07-26 ASSESSMENT — ENCOUNTER SYMPTOMS
BLOOD IN STOOL: 0
NAUSEA: 0
SHORTNESS OF BREATH: 0
DIARRHEA: 0
ABDOMINAL PAIN: 0
BACK PAIN: 0
COUGH: 0
CONSTIPATION: 0
VOMITING: 0
WHEEZING: 0

## 2024-07-26 NOTE — PROGRESS NOTES
OUTPATIENT CARDIOLOGY FOLLOW-UP    HPI:    Name: Juan Francisco Jaquez    Age: 63 y.o.    Primary Care Physician: Avni Leung DO    Date of Service: 7/26/2024    Chief Complaint:   Chief Complaint   Patient presents with    Cardiac Valve Problem        History of present illness :   63-year-old male who comes today for follow-up visit after recent hospitalization.  He was admitted in May and was diagnosed with bilateral lower extremity DVT and PE.  He has history of degenerative mitral valve with severe mitral regurgitation.  He has history of DVTs treated with Eliquis in the past, thrombocytopenia, leukopenia, varicose vein of the left leg and alcohol abuse.  Patient underwent robotic mitral valve repair [facility] on 7/1/2022.    Patient does not smoke.  He feels good.  He has been active.  He walks and lift heavy boxes with no chest discomfort or dyspnea on exertion.  He denies palpitations, dizziness or syncope.  He denies lower extremity edema.    EKG done today revealed sinus rhythm at 69 bpm, left atrial enlargement and left anterior fascicular block.      Review of Systems:   Review of Systems   Constitutional:  Negative for chills, fatigue and fever.   HENT:  Negative for nosebleeds.    Respiratory:  Negative for cough, shortness of breath and wheezing.    Gastrointestinal:  Negative for abdominal pain, blood in stool, constipation, diarrhea, nausea and vomiting.   Genitourinary:  Negative for dysuria and hematuria.   Musculoskeletal:  Negative for back pain, joint swelling and myalgias.   Neurological:  Negative for syncope and light-headedness.   Psychiatric/Behavioral:  The patient is not nervous/anxious.           Past Medical History:  Past Medical History:   Diagnosis Date    Chronic venous insufficiency 02/26/2015    History of DVT (deep vein thrombosis) 02/26/2015    Hx of blood clots 01/01/2009    LEFT LEG    Inguinal hernia     Screening for colon cancer 2024    Varicose veins of lower extremities

## 2024-11-13 ENCOUNTER — TRANSCRIBE ORDERS (OUTPATIENT)
Dept: ADMINISTRATIVE | Age: 64
End: 2024-11-13

## 2024-11-13 DIAGNOSIS — I26.99 PULMONARY EMBOLISM, UNSPECIFIED CHRONICITY, UNSPECIFIED PULMONARY EMBOLISM TYPE, UNSPECIFIED WHETHER ACUTE COR PULMONALE PRESENT (HCC): Primary | ICD-10-CM

## 2024-12-17 ENCOUNTER — HOSPITAL ENCOUNTER (OUTPATIENT)
Age: 64
Discharge: HOME | End: 2024-12-17
Payer: COMMERCIAL

## 2024-12-17 LAB
ALBUMIN SERPL-MCNC: 4.3 G/DL (ref 3.5–5.2)
ALP SERPL-CCNC: 53 U/L (ref 40–129)
ALT SERPL-CCNC: 13 U/L (ref 0–40)
ANION GAP SERPL CALCULATED.3IONS-SCNC: 7 MMOL/L (ref 7–16)
AST SERPL-CCNC: 16 U/L (ref 0–39)
BILIRUB DIRECT SERPL-MCNC: <0.2 MG/DL (ref 0–0.3)
BILIRUB INDIRECT SERPL-MCNC: NORMAL MG/DL (ref 0–1)
BILIRUB SERPL-MCNC: 0.5 MG/DL (ref 0–1.2)
BUN SERPL-MCNC: 14 MG/DL (ref 6–23)
CALCIUM SERPL-MCNC: 9.6 MG/DL (ref 8.6–10.2)
CHLORIDE SERPL-SCNC: 103 MMOL/L (ref 98–107)
CO2 SERPL-SCNC: 30 MMOL/L (ref 22–29)
CREAT SERPL-MCNC: 1 MG/DL (ref 0.7–1.2)
GFR, ESTIMATED: 82 ML/MIN/1.73M2
GLUCOSE SERPL-MCNC: 97 MG/DL (ref 74–99)
POTASSIUM SERPL-SCNC: 4.1 MMOL/L (ref 3.5–5)
PROT SERPL-MCNC: 6.9 G/DL (ref 6.4–8.3)
PSA SERPL-MCNC: 0.73 NG/ML (ref 0–4)
SODIUM SERPL-SCNC: 140 MMOL/L (ref 132–146)

## 2024-12-17 PROCEDURE — 84153 ASSAY OF PSA TOTAL: CPT

## 2024-12-17 PROCEDURE — 82248 BILIRUBIN DIRECT: CPT

## 2024-12-17 PROCEDURE — 36415 COLL VENOUS BLD VENIPUNCTURE: CPT

## 2024-12-17 PROCEDURE — 80053 COMPREHEN METABOLIC PANEL: CPT

## 2024-12-27 ENCOUNTER — HOSPITAL ENCOUNTER (OUTPATIENT)
Dept: CT IMAGING | Age: 64
Discharge: HOME | End: 2024-12-29
Attending: GENERAL PRACTICE
Payer: COMMERCIAL

## 2024-12-27 DIAGNOSIS — I26.99 PULMONARY EMBOLISM, UNSPECIFIED CHRONICITY, UNSPECIFIED PULMONARY EMBOLISM TYPE, UNSPECIFIED WHETHER ACUTE COR PULMONALE PRESENT (HCC): ICD-10-CM

## 2024-12-27 PROCEDURE — 6360000004 HC RX CONTRAST MEDICATION: Performed by: RADIOLOGY

## 2024-12-27 PROCEDURE — 2500000003 HC RX 250 WO HCPCS: Performed by: RADIOLOGY

## 2024-12-27 PROCEDURE — 71275 CT ANGIOGRAPHY CHEST: CPT

## 2024-12-27 RX ORDER — SODIUM CHLORIDE 0.9 % (FLUSH) 0.9 %
10 SYRINGE (ML) INJECTION PRN
Status: DISCONTINUED | OUTPATIENT
Start: 2024-12-27 | End: 2024-12-30 | Stop reason: HOSPADM

## 2024-12-27 RX ORDER — IOPAMIDOL 755 MG/ML
75 INJECTION, SOLUTION INTRAVASCULAR
Status: COMPLETED | OUTPATIENT
Start: 2024-12-27 | End: 2024-12-27

## 2024-12-27 RX ADMIN — IOPAMIDOL 75 ML: 755 INJECTION, SOLUTION INTRAVENOUS at 08:52

## 2024-12-27 RX ADMIN — Medication 10 ML: at 08:54

## 2025-02-05 RX ORDER — LISINOPRIL 5 MG/1
5 TABLET ORAL EVERY 12 HOURS SCHEDULED
Qty: 60 TABLET | Refills: 5 | Status: SHIPPED | OUTPATIENT
Start: 2025-02-05

## 2025-08-15 ENCOUNTER — OFFICE VISIT (OUTPATIENT)
Dept: CARDIOLOGY CLINIC | Age: 65
End: 2025-08-15
Payer: MEDICARE

## 2025-08-15 VITALS
SYSTOLIC BLOOD PRESSURE: 136 MMHG | DIASTOLIC BLOOD PRESSURE: 88 MMHG | HEART RATE: 95 BPM | BODY MASS INDEX: 24.6 KG/M2 | WEIGHT: 171.8 LBS | HEIGHT: 70 IN | RESPIRATION RATE: 18 BRPM

## 2025-08-15 DIAGNOSIS — I34.0 NONRHEUMATIC MITRAL VALVE REGURGITATION: Primary | ICD-10-CM

## 2025-08-15 PROCEDURE — 3079F DIAST BP 80-89 MM HG: CPT | Performed by: INTERNAL MEDICINE

## 2025-08-15 PROCEDURE — 3017F COLORECTAL CA SCREEN DOC REV: CPT | Performed by: INTERNAL MEDICINE

## 2025-08-15 PROCEDURE — 99214 OFFICE O/P EST MOD 30 MIN: CPT | Performed by: INTERNAL MEDICINE

## 2025-08-15 PROCEDURE — G8427 DOCREV CUR MEDS BY ELIG CLIN: HCPCS | Performed by: INTERNAL MEDICINE

## 2025-08-15 PROCEDURE — 4004F PT TOBACCO SCREEN RCVD TLK: CPT | Performed by: INTERNAL MEDICINE

## 2025-08-15 PROCEDURE — 3075F SYST BP GE 130 - 139MM HG: CPT | Performed by: INTERNAL MEDICINE

## 2025-08-15 PROCEDURE — G8420 CALC BMI NORM PARAMETERS: HCPCS | Performed by: INTERNAL MEDICINE

## 2025-08-15 PROCEDURE — 1123F ACP DISCUSS/DSCN MKR DOCD: CPT | Performed by: INTERNAL MEDICINE

## 2025-08-15 PROCEDURE — 93000 ELECTROCARDIOGRAM COMPLETE: CPT | Performed by: INTERNAL MEDICINE

## 2025-08-15 RX ORDER — ASPIRIN 325 MG
325 TABLET ORAL DAILY
COMMUNITY

## 2025-08-15 RX ORDER — METOPROLOL SUCCINATE 25 MG/1
25 TABLET, EXTENDED RELEASE ORAL DAILY
Qty: 60 TABLET | Refills: 5 | Status: SHIPPED | OUTPATIENT
Start: 2025-08-15

## 2025-08-15 ASSESSMENT — ENCOUNTER SYMPTOMS
ABDOMINAL PAIN: 0
WHEEZING: 0
NAUSEA: 0
SHORTNESS OF BREATH: 0
BLOOD IN STOOL: 0
BACK PAIN: 1
VOMITING: 0
COUGH: 0
CONSTIPATION: 0
DIARRHEA: 0

## (undated) DEVICE — SPONGE GZ W4XL4IN RAYON POLY CVR W/NONWOVEN FAB STRL 2/PK

## (undated) DEVICE — GRADUATE TRIANG MEASURE 1000ML BLK PRNT